# Patient Record
Sex: FEMALE | Race: WHITE | NOT HISPANIC OR LATINO
[De-identification: names, ages, dates, MRNs, and addresses within clinical notes are randomized per-mention and may not be internally consistent; named-entity substitution may affect disease eponyms.]

---

## 2017-01-03 ENCOUNTER — TRANSCRIPTION ENCOUNTER (OUTPATIENT)
Age: 57
End: 2017-01-03

## 2017-09-12 ENCOUNTER — INPATIENT (INPATIENT)
Facility: HOSPITAL | Age: 57
LOS: 8 days | Discharge: HOME | End: 2017-09-21
Attending: PSYCHIATRY & NEUROLOGY | Admitting: PSYCHIATRY & NEUROLOGY
Payer: MEDICARE

## 2017-09-12 VITALS
RESPIRATION RATE: 20 BRPM | SYSTOLIC BLOOD PRESSURE: 170 MMHG | HEART RATE: 72 BPM | OXYGEN SATURATION: 99 % | DIASTOLIC BLOOD PRESSURE: 92 MMHG | TEMPERATURE: 98 F

## 2017-09-12 DIAGNOSIS — F41.1 GENERALIZED ANXIETY DISORDER: ICD-10-CM

## 2017-09-12 DIAGNOSIS — F70 MILD INTELLECTUAL DISABILITIES: ICD-10-CM

## 2017-09-12 DIAGNOSIS — F25.9 SCHIZOAFFECTIVE DISORDER, UNSPECIFIED: ICD-10-CM

## 2017-09-12 DIAGNOSIS — F25.0 SCHIZOAFFECTIVE DISORDER, BIPOLAR TYPE: ICD-10-CM

## 2017-09-12 LAB
ALBUMIN SERPL ELPH-MCNC: 3.9 G/DL — SIGNIFICANT CHANGE UP (ref 3.3–5)
ALP SERPL-CCNC: 50 U/L — SIGNIFICANT CHANGE UP (ref 40–120)
ALT FLD-CCNC: 9 U/L — SIGNIFICANT CHANGE UP (ref 4–33)
APAP SERPL-MCNC: < 15 UG/ML — LOW (ref 15–25)
AST SERPL-CCNC: 18 U/L — SIGNIFICANT CHANGE UP (ref 4–32)
BARBITURATES MEASUREMENT: NEGATIVE — SIGNIFICANT CHANGE UP
BENZODIAZ SERPL-MCNC: NEGATIVE — SIGNIFICANT CHANGE UP
BILIRUB SERPL-MCNC: 0.3 MG/DL — SIGNIFICANT CHANGE UP (ref 0.2–1.2)
BUN SERPL-MCNC: 11 MG/DL — SIGNIFICANT CHANGE UP (ref 7–23)
CALCIUM SERPL-MCNC: 9.4 MG/DL — SIGNIFICANT CHANGE UP (ref 8.4–10.5)
CHLORIDE SERPL-SCNC: 103 MMOL/L — SIGNIFICANT CHANGE UP (ref 98–107)
CO2 SERPL-SCNC: 26 MMOL/L — SIGNIFICANT CHANGE UP (ref 22–31)
CREAT SERPL-MCNC: 0.75 MG/DL — SIGNIFICANT CHANGE UP (ref 0.5–1.3)
ETHANOL BLD-MCNC: < 10 MG/DL — SIGNIFICANT CHANGE UP
GLUCOSE SERPL-MCNC: 87 MG/DL — SIGNIFICANT CHANGE UP (ref 70–99)
HCT VFR BLD CALC: 42.1 % — SIGNIFICANT CHANGE UP (ref 34.5–45)
HGB BLD-MCNC: 13.7 G/DL — SIGNIFICANT CHANGE UP (ref 11.5–15.5)
MCHC RBC-ENTMCNC: 31.9 PG — SIGNIFICANT CHANGE UP (ref 27–34)
MCHC RBC-ENTMCNC: 32.5 % — SIGNIFICANT CHANGE UP (ref 32–36)
MCV RBC AUTO: 97.9 FL — SIGNIFICANT CHANGE UP (ref 80–100)
NRBC # FLD: 0 — SIGNIFICANT CHANGE UP
PLATELET # BLD AUTO: 176 K/UL — SIGNIFICANT CHANGE UP (ref 150–400)
PMV BLD: 10.4 FL — SIGNIFICANT CHANGE UP (ref 7–13)
POTASSIUM SERPL-MCNC: 4.2 MMOL/L — SIGNIFICANT CHANGE UP (ref 3.5–5.3)
POTASSIUM SERPL-SCNC: 4.2 MMOL/L — SIGNIFICANT CHANGE UP (ref 3.5–5.3)
PROT SERPL-MCNC: 6.7 G/DL — SIGNIFICANT CHANGE UP (ref 6–8.3)
RBC # BLD: 4.3 M/UL — SIGNIFICANT CHANGE UP (ref 3.8–5.2)
RBC # FLD: 13 % — SIGNIFICANT CHANGE UP (ref 10.3–14.5)
SALICYLATES SERPL-MCNC: < 5 MG/DL — LOW (ref 15–30)
SODIUM SERPL-SCNC: 143 MMOL/L — SIGNIFICANT CHANGE UP (ref 135–145)
TSH SERPL-MCNC: 3.81 UIU/ML — SIGNIFICANT CHANGE UP (ref 0.27–4.2)
VALPROATE SERPL-MCNC: 108.4 UG/ML — HIGH (ref 50–100)
WBC # BLD: 5.38 K/UL — SIGNIFICANT CHANGE UP (ref 3.8–10.5)
WBC # FLD AUTO: 5.38 K/UL — SIGNIFICANT CHANGE UP (ref 3.8–10.5)

## 2017-09-12 PROCEDURE — 99285 EMERGENCY DEPT VISIT HI MDM: CPT

## 2017-09-12 NOTE — ED BEHAVIORAL HEALTH ASSESSMENT NOTE - MEDICAL ISSUES AND PLAN (INCLUDE STANDING AND PRN MEDICATION)
will restart outpatient medications as per MAR obtained with treatment for seizure disorder, GERD, vitamin D deficiency provided.  Patient is medically cleared as per EM team. Ambulate with walker as per outpatient treatment plan, PT consult ordered, will restart outpatient medications as per MAR obtained with treatment for seizure disorder, GERD, vitamin D deficiency provided.  Patient is medically cleared as per EM team.

## 2017-09-12 NOTE — ED BEHAVIORAL HEALTH ASSESSMENT NOTE - PSYCHIATRIC ISSUES AND PLAN (INCLUDE STANDING AND PRN MEDICATION)
Will restart Zyprexa 5mg in AM and 20mg HS for psychosis, discontinue Stelazine to avoid polypharmacy that has not been beneficial.  Restart Depakote DR at outpatient regimen.  Will temporarily restart Remeron 15mg HS, but d/c Trazodone and consider d/c of Remeron as well due to potential activating effect.  Provided Ativan 1mg PO q6hr prn anxiety and Zyprexa 5mg IM prn severe psychotic agitation. Will restart Zyprexa 5mg in AM and 20mg HS for psychosis, discontinue Stelazine to avoid polypharmacy that has not been beneficial.  Restart Depakote DR 500mg TID at outpatient regimen.  Will temporarily restart Remeron 15mg HS, but d/c Trazodone and consider d/c of Remeron as well due to potential activating effect.  Provided Ativan 1mg PO q6hr prn anxiety and Zyprexa 5mg IM prn severe psychotic agitation.

## 2017-09-12 NOTE — ED BEHAVIORAL HEALTH ASSESSMENT NOTE - CURRENT MEDICATION
see MAR provided by residence Zyprexa 5mg PO AM and 20mg HS, Stelazine 2mg PO BID, Depakote DR 500mg PO TID, Remeron 15mg PO QHS, Trazodone 50mg PO QHS prn, Tamoxifen 20mg daily, Omeprazole 20mg daily, Ditropan 5mg TID, Calcium 500mg + Vitamin D tablet daily, Vitamin D3 1000IU daily, Lactaid 3000units PO daily with any dairy product, Selsun blue 1% topically for scalp daily, Ketoconazole 2% cream apply topically to face daily, Sodium chloride nasal spray 2 sprays each nostril BID, Boost pudding 5% one can daily

## 2017-09-12 NOTE — ED BEHAVIORAL HEALTH ASSESSMENT NOTE - SUMMARY
This is a 57 year old single  female with history of schizoaffective disorder, multiple past psychiatric hospitalizations, PMH of seizure disorder, mild MR, seborrheic dermatitis, hx of breast cancer, GERD, Vitamin D deficiency is sent to [facility] ER by staff from outpatient psychiatrist appointment due to an increase in erratic behavior in the context of acute psychotic symptoms.  Patient has been taking off clothes in public, is notably disorganized, perseverative on delusional themes related to certain types of music having an impact on her behavior.  Patient has been having more incidents of intrusive behavior in residence and continues to ruminate on random topics with erratic behavior exhibited, as per collateral obtained.  Patient has a history of chronic delusions but of concern is reports of increasingly erratic, dangerous behavior over the past 2 weeks.  As per Sabina Plascencia RN patient has been preoccupied with the stove being on and has tried to put her hangs over the fire, has been running into the street and stripping her clothes as well.  Patient is a poor historian, reportedly has a legal guardian in the community, her brother, and due to her worsening symptoms has had recent medication changes without benefit.  There has been a recent increase in both Zyprexa and Depakote dosage, and a recent addition of Stelazine to regimen but patient remains acutely disorganized, delusional, appears to be acutely psychotic with labile affect, and is exhibiting pressured speech at this time.  As per note by Psych NP in the community, conversation with RN at residence and d/w staff member patient is reported to be an acute risk to self at this time.  Patient denies SI, no intent, no plan, no gestures, no HI, acutely paranoid, delusional, tangential in her thought process with loose associations.  Patient denies AH or VH but does appear to be internally stimulated, denies anxiety or panic attacks but is exhibiting obsessional thinking with ruminations.  Patient with chronic mental illness of schizoaffective disorder, baseline intellectual disability but at this time is not at baseline with erratic behavior, poor impulse control and acute psychosis evident.  Patient represents an acute risk to self and requires inpatient psychiatric hospitalization for her safety.  D/W RN from the community supports this plan and states plan to coordinate care with inpatient psych team on Low 4. This is a 57 year old single  female with history of schizoaffective disorder, multiple past psychiatric hospitalizations, PMH of seizure disorder, mild MR, seborrheic dermatitis, hx of breast cancer, GERD, Vitamin D deficiency is sent to [facility] ER by staff from outpatient psychiatrist appointment due to an increase in erratic behavior in the context of acute psychotic symptoms.  Patient has been taking off clothes in public, is notably disorganized, perseverative on delusional themes related to certain types of music having an impact on her behavior.  Patient has been having more incidents of intrusive behavior in residence and continues to ruminate on random topics with erratic behavior exhibited, as per collateral obtained.  Patient has been preoccupied with the stove being on and has tried to put her hands over the fire, has been running into the street and stripping off her clothes as well.  Patient is a poor historian and due to her worsening symptoms has had recent medication changes without benefit.  There has been a recent increase in both Zyprexa and Depakote dosage, and a recent addition of Stelazine to regimen but patient remains acutely disorganized, delusional, appears to be acutely psychotic with labile affect, and is exhibiting pressured speech at this time.  Patient is acutely paranoid, delusional, disorganized in her thought process with loose associations.  Patient denies AH or VH but does appear to be internally stimulated, denies anxiety or panic attacks but is exhibiting obsessional thinking with ruminations.  Patient with chronic mental illness of schizoaffective disorder, baseline intellectual disability but at this time is not at baseline with erratic behavior, poor impulse control and acute psychosis evident.  Patient represents an acute risk to self and requires inpatient psychiatric hospitalization for her safety.  D/W RN from the community who supports psychiatric admission and states plan to coordinate care with inpatient psych team on Low 4.  No indication for constant observation in a locked, supervised setting at this time.  EMS transportation to unit arranged with buckle guard for safe transport.

## 2017-09-12 NOTE — ED BEHAVIORAL HEALTH ASSESSMENT NOTE - HPI (INCLUDE ILLNESS QUALITY, SEVERITY, DURATION, TIMING, CONTEXT, MODIFYING FACTORS, ASSOCIATED SIGNS AND SYMPTOMS)
This is a 57 year old single  female with history of schizoaffective disorder, multiple past psychiatric hospitalizations is sent to [facility] ER by EMS from outpatient psychiatrist appointment due to an increase in erratic behavior in the context of acute psychotic symptoms.  Patient has been taking off clothes in public, is notably disorganized, perseverative on delusional themes related to certain types of music having an impact on her behavior.  Patient has been having more incidents of intrusive behavior in residence and continues to ruminate on random topics with erratic behavior exhibited, as per collateral obtained. This is a 57 year old single  female with history of schizoaffective disorder, multiple past psychiatric hospitalizations is sent to [facility] by EMS from outpatient psychiatrist appointment due to an increase in erratic behavior in the context of acute psychotic symptoms.  Patient has been taking off clothes in public, is notably disorganized, perseverative on delusional themes related to certain types of music having an impact on her behavior.  Patient has been having more incidents of intrusive behavior in residence and continues to ruminate on random topics with erratic behavior exhibited, as per collateral obtained.  Patient has a history of chronic delusions but of concern is reports of increasingly erratic, dangerous behavior over the past 2 weeks.  As per Sabina Plascencia RN patient has been preoccupied with the stove being on and has tried to put her hangs over the fire, has been running into the street and stripping her clothes as well.  Patient is a poor historian, reportedly has a legal guardian in the community, her brother, and due to her worsening symptoms has had recent medication changes without benefit.  There has been a recent increase in both Zyprexa and Depakote dosage, and a recent addition of Stelazine to regimen but patient remains acutely disorganized, delusional, appears to be acutely psychotic with labile affect, and is exhibiting pressured speech at this time.  As per note by Psych NP in the community, conversation with RN at residence and d/w staff member patient is reported to be an acute risk to self at this time.  Patient denies SI, no intent, no plan, no gestures, no HI, acutely paranoid, delusional, tangential in her thought process with loose associations.  Patient denies AH or VH but does appear to be internally stimulated, denies anxiety or panic attacks but is exhibiting obsessional thinking with ruminations.  Patient with chronic mental illness of schizoaffective disorder, baseline intellectual disibility but at this time is not at baseline with erratic behavior, poor impulse control and acute psychosis evident.  Patient represents an acute risk to self and requires inpatient psychiatric hosptialization for her safety.  D/W RN from the community supports this plan and states plan to coordinate care with inpatient psych team on Low 4. This is a 57 year old single  female with history of schizoaffective disorder, multiple past psychiatric hospitalizations, PMH of seizure disorder, mild MR, seborrheic dermatitis, hx of breast cancer, GERD, Vitamin D deficiency is sent to [facility] ER by staff from outpatient psychiatrist appointment due to an increase in erratic behavior in the context of acute psychotic symptoms.  Patient has been taking off clothes in public, is notably disorganized, perseverative on delusional themes related to certain types of music having an impact on her behavior.  Patient has been having more incidents of intrusive behavior in residence and continues to ruminate on random topics with erratic behavior exhibited, as per collateral obtained.  Patient has a history of chronic delusions but of concern is reports of increasingly erratic, dangerous behavior over the past 2 weeks.  As per Sabina Plascencia RN patient has been preoccupied with the stove being on and has tried to put her hangs over the fire, has been running into the street and stripping her clothes as well.  Patient is a poor historian, reportedly has a legal guardian in the community, her brother, and due to her worsening symptoms has had recent medication changes without benefit.  There has been a recent increase in both Zyprexa and Depakote dosage, and a recent addition of Stelazine to regimen but patient remains acutely disorganized, delusional, appears to be acutely psychotic with labile affect, and is exhibiting pressured speech at this time.  As per note by Psych NP in the community, conversation with RN at residence and d/w staff member patient is reported to be an acute risk to self at this time.  Patient denies SI, no intent, no plan, no gestures, no HI, acutely paranoid, delusional, tangential in her thought process with loose associations.  Patient denies AH or VH but does appear to be internally stimulated, denies anxiety or panic attacks but is exhibiting obsessional thinking with ruminations.  Patient with chronic mental illness of schizoaffective disorder, baseline intellectual disability but at this time is not at baseline with erratic behavior, poor impulse control and acute psychosis evident.  Patient represents an acute risk to self and requires inpatient psychiatric hosptialization for her safety.  D/W RN from the community supports this plan and states plan to coordinate care with inpatient psych team on Low 4. This is a 57 year old single  female with history of schizoaffective disorder, multiple past psychiatric hospitalizations, PMH of seizure disorder, mild MR, seborrheic dermatitis, hx of breast cancer, GERD, Vitamin D deficiency is sent to [facility] ER by staff from outpatient psychiatrist appointment due to an increase in erratic behavior in the context of acute psychotic symptoms.  Patient has been taking off clothes in public, is notably disorganized, perseverative on delusional themes related to certain types of music having an impact on her behavior.  Patient has been having more incidents of intrusive behavior in residence and continues to ruminate on random topics with erratic behavior exhibited, as per collateral obtained.  Patient has a history of chronic delusions but of concern is reports of increasingly erratic, dangerous behavior over the past 2 weeks.  As per Sabina Plascencia RN patient has been preoccupied with the stove being on and has tried to put her hands over the fire, has been running into the street and stripping her clothes off as well.  Patient is a poor historian, reportedly has a legal guardian in the community, her brother, and due to her worsening symptoms has had recent medication changes without benefit.  There has been a recent increase in both Zyprexa and Depakote dosage, and a recent addition of Stelazine to regimen but patient remains acutely disorganized, delusional, appears to be acutely psychotic with labile affect, and is exhibiting pressured speech at this time.  As per note by Psych NP in the community, conversation with RN at residence and d/w staff member patient is reported to be an acute risk to self at this time.  Patient denies SI, no intent, no plan, no gestures, no HI, but is acutely paranoid, delusional, tangential in her thought process with loose associations.  Patient denies AH or VH but does appear to be internally stimulated, denies anxiety or panic attacks but is exhibiting obsessional thinking with ruminations.  Patient with chronic mental illness of schizoaffective disorder, baseline intellectual disability but at this time is not at baseline with erratic behavior, poor impulse control and acute psychosis evident.  Patient represents an acute risk to self and requires inpatient psychiatric hospitalization for her safety.  D/W RN from the community who supports this plan and states plan to coordinate care with inpatient psych team on Low 4. This is a 57 year old single  female with history of schizoaffective disorder, multiple past psychiatric hospitalizations, no history of suicide attempts, no history of violent behavior, no history of substance abuse, PMH of seizure disorder, mild MR, seborrheic dermatitis, hx of breast cancer, GERD, Vitamin D deficiency is sent to [facility] ER by staff from outpatient psychiatrist appointment due to an increase in erratic behavior in the context of acute psychotic symptoms.  Patient has been taking off clothes in public, is notably disorganized, perseverative on delusional themes related to certain types of music having an impact on her behavior.  Patient has been having more incidents of intrusive behavior in residence and continues to ruminate on random topics with erratic behavior exhibited, as per collateral obtained.  Patient has a history of chronic delusions but of concern is reports of increasingly erratic, dangerous behavior over the past 2 weeks.  As per Sabina Plascencia RN patient has been preoccupied with the stove being on and has tried to put her hands over the fire, has been running into the street and stripping her clothes off as well.  Patient is a poor historian, reportedly has a legal guardian in the community, her brother, and due to her worsening symptoms has had recent medication changes without benefit.  There has been a recent increase in both Zyprexa and Depakote dosage, and a recent addition of Stelazine to regimen but patient remains acutely disorganized, delusional, appears to be acutely psychotic with labile affect, and is exhibiting pressured speech at this time.  As per note by Psych NP in the community, conversation with RN at residence and d/w staff member patient is reported to be an acute risk to self at this time.  Patient denies SI, no intent, no plan, no gestures, no HI, but is acutely paranoid, delusional, tangential in her thought process with loose associations.  Patient denies AH or VH but does appear to be internally stimulated, denies anxiety or panic attacks but is exhibiting obsessional thinking with ruminations.  Patient has reportedly been medication compliant with depakote level of 108 received consistent with such.  Patient with chronic mental illness of schizoaffective disorder, baseline intellectual disability but at this time is not at baseline with erratic behavior, poor impulse control and acute psychosis evident.  Patient represents an acute risk to self and requires inpatient psychiatric hospitalization for her safety.  D/W RN from the community who supports this plan and states plan to coordinate care with inpatient psych team on Low 4.

## 2017-09-12 NOTE — ED BEHAVIORAL HEALTH ASSESSMENT NOTE - AXIS III
Seizure disorder, hx of breast cancer/right breast mastectomy, GERD, Seborrheic dermatitis, Arthritis and contractures of bilateral knees, hx of urinary incontinence, vitamin D deficiency

## 2017-09-12 NOTE — ED PROVIDER NOTE - PMH
Breast cancer    GERD (gastroesophageal reflux disease)    MR (mental retardation)    Seizure    Vitamin D deficiency

## 2017-09-12 NOTE — ED PROVIDER NOTE - NS ED ROS FT
Denies chest pain, SOB, N/V/D and fevers, Denies palpitations or diaphoresis. Denies Numbness, Tingling, Blurry Vision and HA.   Denies recent falls, trauma and injuries. Denies pain or any other medical complaints.

## 2017-09-12 NOTE — ED BEHAVIORAL HEALTH ASSESSMENT NOTE - OTHER PAST PSYCHIATRIC HISTORY (INCLUDE DETAILS REGARDING ONSET, COURSE OF ILLNESS, INPATIENT/OUTPATIENT TREATMENT)
History of documented schizoaffective disorder, generalized anxiety disorder, PTSD, mild intellectual disabilities who sees psychiatric NP Shruti Spencer at [facility], most recently today on 9/12 for a crisis appointment.  Patient with multiple psychiatric hospitalizations including most recently on 4/19/17-5/11/17 at [facility].  No reported history of suicide attempts.

## 2017-09-12 NOTE — ED BEHAVIORAL HEALTH ASSESSMENT NOTE - DESCRIPTION
Seborrheic dermatitis, GERD, mild MR, hx of breast cancer, osteoporosis/osteoarthritis, Vitamin D deficiency, urinary incontinence calm, cooperative, no prns required during ED course lives at [facility] lives at [facility] residence located at [address] [place] [address] in [place], [place], has been living there for over 5 years.  Reportedly has a legal guardian, brother, as per d/w RN, Sabina Plascencia.

## 2017-09-12 NOTE — ED PROVIDER NOTE - OBJECTIVE STATEMENT
This is a 57 year old Female PMH of seizure disorder, mild MR, seborrheic dermatitis, hx of breast cancer, GERD, Vitamin D deficiency is sent to [facility] ER by staff from outpatient psychiatrist appointment due to an increase in erratic behavior in the context of acute psychotic symptoms.  Patient has been taking off clothes in public, is notably disorganized, perseverative on delusional themes related to certain types of music having an impact on her behavior.

## 2017-09-12 NOTE — ED PROVIDER NOTE - MEDICAL DECISION MAKING DETAILS
This is a 57 year old Female PMH of seizure disorder, mild MR, seborrheic dermatitis, hx of breast cancer, GERD, Vitamin D deficiency is sent to [facility] ER by staff from outpatient psychiatrist appointment due to an increase in erratic behavior in the context of acute psychotic symptoms.  Medical evaluation performed. There is no clinical evidence of intoxication or any acute medical problem requiring immediate intervention. Final disposition will be determined by psychiatrist.

## 2017-09-13 PROCEDURE — 99223 1ST HOSP IP/OBS HIGH 75: CPT

## 2017-09-13 RX ORDER — SELENIUM SULFIDE/ALOE VERA 1 %
1 SHAMPOO TOPICAL DAILY
Qty: 0 | Refills: 0 | Status: DISCONTINUED | OUTPATIENT
Start: 2017-09-13 | End: 2017-09-13

## 2017-09-13 RX ORDER — TRAZODONE HCL 50 MG
50 TABLET ORAL AT BEDTIME
Qty: 0 | Refills: 0 | Status: DISCONTINUED | OUTPATIENT
Start: 2017-09-13 | End: 2017-09-21

## 2017-09-13 RX ORDER — OLANZAPINE 15 MG/1
5 TABLET, FILM COATED ORAL DAILY
Qty: 0 | Refills: 0 | Status: DISCONTINUED | OUTPATIENT
Start: 2017-09-13 | End: 2017-09-21

## 2017-09-13 RX ORDER — TAMOXIFEN CITRATE 20 MG/1
20 TABLET, FILM COATED ORAL DAILY
Qty: 0 | Refills: 0 | Status: DISCONTINUED | OUTPATIENT
Start: 2017-09-13 | End: 2017-09-21

## 2017-09-13 RX ORDER — MIRTAZAPINE 45 MG/1
15 TABLET, ORALLY DISINTEGRATING ORAL AT BEDTIME
Qty: 0 | Refills: 0 | Status: DISCONTINUED | OUTPATIENT
Start: 2017-09-13 | End: 2017-09-13

## 2017-09-13 RX ORDER — CHOLECALCIFEROL (VITAMIN D3) 125 MCG
1000 CAPSULE ORAL DAILY
Qty: 0 | Refills: 0 | Status: DISCONTINUED | OUTPATIENT
Start: 2017-09-13 | End: 2017-09-21

## 2017-09-13 RX ORDER — SODIUM CHLORIDE 0.65 %
1 AEROSOL, SPRAY (ML) NASAL
Qty: 0 | Refills: 0 | Status: DISCONTINUED | OUTPATIENT
Start: 2017-09-13 | End: 2017-09-21

## 2017-09-13 RX ORDER — OLANZAPINE 15 MG/1
20 TABLET, FILM COATED ORAL AT BEDTIME
Qty: 0 | Refills: 0 | Status: DISCONTINUED | OUTPATIENT
Start: 2017-09-13 | End: 2017-09-13

## 2017-09-13 RX ORDER — PANTOPRAZOLE SODIUM 20 MG/1
40 TABLET, DELAYED RELEASE ORAL
Qty: 0 | Refills: 0 | Status: DISCONTINUED | OUTPATIENT
Start: 2017-09-13 | End: 2017-09-21

## 2017-09-13 RX ORDER — OLANZAPINE 15 MG/1
25 TABLET, FILM COATED ORAL AT BEDTIME
Qty: 0 | Refills: 0 | Status: DISCONTINUED | OUTPATIENT
Start: 2017-09-13 | End: 2017-09-15

## 2017-09-13 RX ORDER — KETOCONAZOLE 20 MG/G
1 AEROSOL, FOAM TOPICAL DAILY
Qty: 0 | Refills: 0 | Status: DISCONTINUED | OUTPATIENT
Start: 2017-09-13 | End: 2017-09-21

## 2017-09-13 RX ORDER — OXYBUTYNIN CHLORIDE 5 MG
5 TABLET ORAL THREE TIMES A DAY
Qty: 0 | Refills: 0 | Status: DISCONTINUED | OUTPATIENT
Start: 2017-09-13 | End: 2017-09-21

## 2017-09-13 RX ORDER — DIVALPROEX SODIUM 500 MG/1
500 TABLET, DELAYED RELEASE ORAL THREE TIMES A DAY
Qty: 0 | Refills: 0 | Status: DISCONTINUED | OUTPATIENT
Start: 2017-09-13 | End: 2017-09-15

## 2017-09-13 RX ORDER — B-COMPLEX WITH VITAMIN C
1 CAPSULE ORAL
Qty: 0 | Refills: 0 | COMMUNITY

## 2017-09-13 RX ORDER — TRAZODONE HCL 50 MG
1 TABLET ORAL
Qty: 0 | Refills: 0 | COMMUNITY

## 2017-09-13 RX ORDER — CALCIUM CARBONATE 500(1250)
1 TABLET ORAL DAILY
Qty: 0 | Refills: 0 | Status: DISCONTINUED | OUTPATIENT
Start: 2017-09-13 | End: 2017-09-21

## 2017-09-13 RX ORDER — SELENIUM SULFIDE/ALOE VERA 1 %
1 SHAMPOO TOPICAL DAILY
Qty: 0 | Refills: 0 | Status: DISCONTINUED | OUTPATIENT
Start: 2017-09-13 | End: 2017-09-21

## 2017-09-13 RX ORDER — TRIFLUOPERAZINE HCL 5 MG
1 TABLET ORAL
Qty: 0 | Refills: 0 | COMMUNITY

## 2017-09-13 RX ORDER — B-COMPLEX WITH VITAMIN C
1 CAPSULE ORAL DAILY
Qty: 0 | Refills: 0 | Status: DISCONTINUED | OUTPATIENT
Start: 2017-09-13 | End: 2017-09-21

## 2017-09-13 RX ORDER — OLANZAPINE 15 MG/1
5 TABLET, FILM COATED ORAL ONCE
Qty: 0 | Refills: 0 | Status: DISCONTINUED | OUTPATIENT
Start: 2017-09-13 | End: 2017-09-21

## 2017-09-13 RX ADMIN — TAMOXIFEN CITRATE 20 MILLIGRAM(S): 20 TABLET, FILM COATED ORAL at 10:07

## 2017-09-13 RX ADMIN — DIVALPROEX SODIUM 500 MILLIGRAM(S): 500 TABLET, DELAYED RELEASE ORAL at 10:07

## 2017-09-13 RX ADMIN — Medication 5 MILLIGRAM(S): at 10:07

## 2017-09-13 RX ADMIN — Medication 1 MILLIGRAM(S): at 21:00

## 2017-09-13 RX ADMIN — Medication 50 MILLIGRAM(S): at 21:14

## 2017-09-13 RX ADMIN — Medication 1 TABLET(S): at 18:27

## 2017-09-13 RX ADMIN — Medication 5 MILLIGRAM(S): at 21:14

## 2017-09-13 RX ADMIN — Medication 1 TABLET(S): at 10:07

## 2017-09-13 RX ADMIN — OLANZAPINE 25 MILLIGRAM(S): 15 TABLET, FILM COATED ORAL at 21:14

## 2017-09-13 RX ADMIN — Medication 5 MILLIGRAM(S): at 12:19

## 2017-09-13 RX ADMIN — Medication 1 APPLICATION(S): at 11:30

## 2017-09-13 RX ADMIN — DIVALPROEX SODIUM 500 MILLIGRAM(S): 500 TABLET, DELAYED RELEASE ORAL at 12:19

## 2017-09-13 RX ADMIN — Medication 1000 UNIT(S): at 10:07

## 2017-09-13 RX ADMIN — DIVALPROEX SODIUM 500 MILLIGRAM(S): 500 TABLET, DELAYED RELEASE ORAL at 21:14

## 2017-09-13 RX ADMIN — OLANZAPINE 5 MILLIGRAM(S): 15 TABLET, FILM COATED ORAL at 10:07

## 2017-09-13 RX ADMIN — KETOCONAZOLE 1 APPLICATION(S): 20 AEROSOL, FOAM TOPICAL at 10:07

## 2017-09-13 RX ADMIN — PANTOPRAZOLE SODIUM 40 MILLIGRAM(S): 20 TABLET, DELAYED RELEASE ORAL at 10:07

## 2017-09-14 PROCEDURE — 99232 SBSQ HOSP IP/OBS MODERATE 35: CPT

## 2017-09-14 RX ADMIN — Medication 1 MILLIGRAM(S): at 20:30

## 2017-09-14 RX ADMIN — OLANZAPINE 25 MILLIGRAM(S): 15 TABLET, FILM COATED ORAL at 20:36

## 2017-09-14 RX ADMIN — TAMOXIFEN CITRATE 20 MILLIGRAM(S): 20 TABLET, FILM COATED ORAL at 10:18

## 2017-09-14 RX ADMIN — Medication 1000 UNIT(S): at 10:17

## 2017-09-14 RX ADMIN — Medication 5 MILLIGRAM(S): at 20:36

## 2017-09-14 RX ADMIN — OLANZAPINE 5 MILLIGRAM(S): 15 TABLET, FILM COATED ORAL at 10:18

## 2017-09-14 RX ADMIN — Medication 1 TABLET(S): at 10:16

## 2017-09-14 RX ADMIN — Medication 1 TABLET(S): at 10:17

## 2017-09-14 RX ADMIN — DIVALPROEX SODIUM 500 MILLIGRAM(S): 500 TABLET, DELAYED RELEASE ORAL at 10:17

## 2017-09-14 RX ADMIN — PANTOPRAZOLE SODIUM 40 MILLIGRAM(S): 20 TABLET, DELAYED RELEASE ORAL at 09:00

## 2017-09-14 RX ADMIN — Medication 5 MILLIGRAM(S): at 13:47

## 2017-09-14 RX ADMIN — DIVALPROEX SODIUM 500 MILLIGRAM(S): 500 TABLET, DELAYED RELEASE ORAL at 13:47

## 2017-09-14 RX ADMIN — Medication 5 MILLIGRAM(S): at 10:18

## 2017-09-14 RX ADMIN — Medication 50 MILLIGRAM(S): at 20:36

## 2017-09-14 RX ADMIN — DIVALPROEX SODIUM 500 MILLIGRAM(S): 500 TABLET, DELAYED RELEASE ORAL at 20:36

## 2017-09-15 PROCEDURE — 99232 SBSQ HOSP IP/OBS MODERATE 35: CPT

## 2017-09-15 PROCEDURE — 93010 ELECTROCARDIOGRAM REPORT: CPT

## 2017-09-15 RX ORDER — VALPROIC ACID (AS SODIUM SALT) 250 MG/5ML
500 SOLUTION, ORAL ORAL THREE TIMES A DAY
Qty: 0 | Refills: 0 | Status: DISCONTINUED | OUTPATIENT
Start: 2017-09-15 | End: 2017-09-21

## 2017-09-15 RX ORDER — OLANZAPINE 15 MG/1
30 TABLET, FILM COATED ORAL AT BEDTIME
Qty: 0 | Refills: 0 | Status: DISCONTINUED | OUTPATIENT
Start: 2017-09-15 | End: 2017-09-21

## 2017-09-15 RX ADMIN — OLANZAPINE 30 MILLIGRAM(S): 15 TABLET, FILM COATED ORAL at 21:14

## 2017-09-15 RX ADMIN — PANTOPRAZOLE SODIUM 40 MILLIGRAM(S): 20 TABLET, DELAYED RELEASE ORAL at 08:59

## 2017-09-15 RX ADMIN — Medication 5 MILLIGRAM(S): at 21:14

## 2017-09-15 RX ADMIN — Medication 5 MILLIGRAM(S): at 13:14

## 2017-09-15 RX ADMIN — Medication 50 MILLIGRAM(S): at 21:14

## 2017-09-15 RX ADMIN — OLANZAPINE 5 MILLIGRAM(S): 15 TABLET, FILM COATED ORAL at 08:59

## 2017-09-15 RX ADMIN — Medication 5 MILLIGRAM(S): at 08:59

## 2017-09-15 RX ADMIN — Medication 1 TABLET(S): at 08:59

## 2017-09-15 RX ADMIN — KETOCONAZOLE 1 APPLICATION(S): 20 AEROSOL, FOAM TOPICAL at 08:59

## 2017-09-15 RX ADMIN — Medication 1000 UNIT(S): at 08:59

## 2017-09-15 RX ADMIN — TAMOXIFEN CITRATE 20 MILLIGRAM(S): 20 TABLET, FILM COATED ORAL at 08:59

## 2017-09-15 RX ADMIN — DIVALPROEX SODIUM 500 MILLIGRAM(S): 500 TABLET, DELAYED RELEASE ORAL at 08:59

## 2017-09-15 RX ADMIN — Medication 500 MILLIGRAM(S): at 21:14

## 2017-09-15 RX ADMIN — Medication 500 MILLIGRAM(S): at 13:14

## 2017-09-15 RX ADMIN — Medication 1 APPLICATION(S): at 08:59

## 2017-09-16 PROCEDURE — 99232 SBSQ HOSP IP/OBS MODERATE 35: CPT

## 2017-09-16 RX ADMIN — OLANZAPINE 30 MILLIGRAM(S): 15 TABLET, FILM COATED ORAL at 22:05

## 2017-09-16 RX ADMIN — Medication 500 MILLIGRAM(S): at 13:54

## 2017-09-16 RX ADMIN — Medication 1000 UNIT(S): at 09:10

## 2017-09-16 RX ADMIN — KETOCONAZOLE 1 APPLICATION(S): 20 AEROSOL, FOAM TOPICAL at 09:10

## 2017-09-16 RX ADMIN — PANTOPRAZOLE SODIUM 40 MILLIGRAM(S): 20 TABLET, DELAYED RELEASE ORAL at 09:10

## 2017-09-16 RX ADMIN — Medication 1 TABLET(S): at 09:09

## 2017-09-16 RX ADMIN — Medication 1 APPLICATION(S): at 09:10

## 2017-09-16 RX ADMIN — Medication 500 MILLIGRAM(S): at 22:06

## 2017-09-16 RX ADMIN — Medication 1 TABLET(S): at 09:10

## 2017-09-16 RX ADMIN — Medication 50 MILLIGRAM(S): at 22:06

## 2017-09-16 RX ADMIN — Medication 5 MILLIGRAM(S): at 09:10

## 2017-09-16 RX ADMIN — TAMOXIFEN CITRATE 20 MILLIGRAM(S): 20 TABLET, FILM COATED ORAL at 09:10

## 2017-09-16 RX ADMIN — Medication 5 MILLIGRAM(S): at 22:06

## 2017-09-16 RX ADMIN — OLANZAPINE 5 MILLIGRAM(S): 15 TABLET, FILM COATED ORAL at 09:10

## 2017-09-16 RX ADMIN — Medication 500 MILLIGRAM(S): at 09:10

## 2017-09-16 RX ADMIN — Medication 5 MILLIGRAM(S): at 13:54

## 2017-09-17 PROCEDURE — 99232 SBSQ HOSP IP/OBS MODERATE 35: CPT

## 2017-09-17 RX ADMIN — OLANZAPINE 30 MILLIGRAM(S): 15 TABLET, FILM COATED ORAL at 21:32

## 2017-09-17 RX ADMIN — Medication 500 MILLIGRAM(S): at 21:32

## 2017-09-17 RX ADMIN — Medication 50 MILLIGRAM(S): at 21:32

## 2017-09-17 RX ADMIN — Medication 1 TABLET(S): at 08:54

## 2017-09-17 RX ADMIN — Medication 1 APPLICATION(S): at 13:21

## 2017-09-17 RX ADMIN — KETOCONAZOLE 1 APPLICATION(S): 20 AEROSOL, FOAM TOPICAL at 08:54

## 2017-09-17 RX ADMIN — Medication 5 MILLIGRAM(S): at 21:32

## 2017-09-17 RX ADMIN — PANTOPRAZOLE SODIUM 40 MILLIGRAM(S): 20 TABLET, DELAYED RELEASE ORAL at 08:54

## 2017-09-17 RX ADMIN — TAMOXIFEN CITRATE 20 MILLIGRAM(S): 20 TABLET, FILM COATED ORAL at 08:55

## 2017-09-17 RX ADMIN — Medication 500 MILLIGRAM(S): at 08:55

## 2017-09-17 RX ADMIN — Medication 1000 UNIT(S): at 08:54

## 2017-09-17 RX ADMIN — Medication 5 MILLIGRAM(S): at 08:54

## 2017-09-17 RX ADMIN — OLANZAPINE 5 MILLIGRAM(S): 15 TABLET, FILM COATED ORAL at 08:54

## 2017-09-17 RX ADMIN — Medication 5 MILLIGRAM(S): at 13:21

## 2017-09-18 PROCEDURE — 99232 SBSQ HOSP IP/OBS MODERATE 35: CPT

## 2017-09-18 RX ADMIN — Medication 500 MILLIGRAM(S): at 20:41

## 2017-09-18 RX ADMIN — Medication 1 MILLIGRAM(S): at 19:10

## 2017-09-18 RX ADMIN — KETOCONAZOLE 1 APPLICATION(S): 20 AEROSOL, FOAM TOPICAL at 08:53

## 2017-09-18 RX ADMIN — TAMOXIFEN CITRATE 20 MILLIGRAM(S): 20 TABLET, FILM COATED ORAL at 08:53

## 2017-09-18 RX ADMIN — Medication 500 MILLIGRAM(S): at 12:50

## 2017-09-18 RX ADMIN — Medication 5 MILLIGRAM(S): at 20:41

## 2017-09-18 RX ADMIN — Medication 1000 UNIT(S): at 08:45

## 2017-09-18 RX ADMIN — OLANZAPINE 30 MILLIGRAM(S): 15 TABLET, FILM COATED ORAL at 20:41

## 2017-09-18 RX ADMIN — Medication 50 MILLIGRAM(S): at 20:41

## 2017-09-18 RX ADMIN — PANTOPRAZOLE SODIUM 40 MILLIGRAM(S): 20 TABLET, DELAYED RELEASE ORAL at 08:53

## 2017-09-18 RX ADMIN — OLANZAPINE 5 MILLIGRAM(S): 15 TABLET, FILM COATED ORAL at 08:53

## 2017-09-18 RX ADMIN — Medication 1 TABLET(S): at 08:45

## 2017-09-18 RX ADMIN — Medication 5 MILLIGRAM(S): at 12:50

## 2017-09-18 RX ADMIN — Medication 500 MILLIGRAM(S): at 08:53

## 2017-09-18 RX ADMIN — Medication 5 MILLIGRAM(S): at 08:53

## 2017-09-18 RX ADMIN — Medication 1 APPLICATION(S): at 08:53

## 2017-09-19 PROCEDURE — 99232 SBSQ HOSP IP/OBS MODERATE 35: CPT

## 2017-09-19 RX ADMIN — Medication 5 MILLIGRAM(S): at 09:00

## 2017-09-19 RX ADMIN — Medication 1 TABLET(S): at 09:00

## 2017-09-19 RX ADMIN — TAMOXIFEN CITRATE 20 MILLIGRAM(S): 20 TABLET, FILM COATED ORAL at 09:00

## 2017-09-19 RX ADMIN — KETOCONAZOLE 1 APPLICATION(S): 20 AEROSOL, FOAM TOPICAL at 11:17

## 2017-09-19 RX ADMIN — Medication 500 MILLIGRAM(S): at 21:42

## 2017-09-19 RX ADMIN — Medication 5 MILLIGRAM(S): at 21:42

## 2017-09-19 RX ADMIN — PANTOPRAZOLE SODIUM 40 MILLIGRAM(S): 20 TABLET, DELAYED RELEASE ORAL at 09:00

## 2017-09-19 RX ADMIN — OLANZAPINE 30 MILLIGRAM(S): 15 TABLET, FILM COATED ORAL at 21:42

## 2017-09-19 RX ADMIN — Medication 1 APPLICATION(S): at 11:17

## 2017-09-19 RX ADMIN — Medication 5 MILLIGRAM(S): at 12:44

## 2017-09-19 RX ADMIN — OLANZAPINE 5 MILLIGRAM(S): 15 TABLET, FILM COATED ORAL at 09:00

## 2017-09-19 RX ADMIN — Medication 50 MILLIGRAM(S): at 21:42

## 2017-09-19 RX ADMIN — Medication 500 MILLIGRAM(S): at 09:00

## 2017-09-19 RX ADMIN — Medication 500 MILLIGRAM(S): at 12:45

## 2017-09-19 RX ADMIN — Medication 1000 UNIT(S): at 09:00

## 2017-09-20 PROCEDURE — 99232 SBSQ HOSP IP/OBS MODERATE 35: CPT

## 2017-09-20 RX ORDER — OXYBUTYNIN CHLORIDE 5 MG
1 TABLET ORAL
Qty: 0 | Refills: 0 | COMMUNITY

## 2017-09-20 RX ORDER — OXYMETAZOLINE HYDROCHLORIDE 0.5 MG/ML
2 SPRAY NASAL
Qty: 1 | Refills: 0 | OUTPATIENT
Start: 2017-09-20 | End: 2017-10-05

## 2017-09-20 RX ORDER — OMEPRAZOLE 10 MG/1
1 CAPSULE, DELAYED RELEASE ORAL
Qty: 0 | Refills: 0 | COMMUNITY

## 2017-09-20 RX ORDER — OLANZAPINE 15 MG/1
2 TABLET, FILM COATED ORAL
Qty: 30 | Refills: 0 | OUTPATIENT
Start: 2017-09-20 | End: 2017-10-05

## 2017-09-20 RX ORDER — CHOLECALCIFEROL (VITAMIN D3) 125 MCG
1000 CAPSULE ORAL
Qty: 15 | Refills: 0 | OUTPATIENT
Start: 2017-09-20 | End: 2017-10-05

## 2017-09-20 RX ORDER — B-COMPLEX WITH VITAMIN C
1 CAPSULE ORAL
Qty: 45 | Refills: 0 | OUTPATIENT
Start: 2017-09-20 | End: 2017-10-05

## 2017-09-20 RX ORDER — DIVALPROEX SODIUM 500 MG/1
1 TABLET, DELAYED RELEASE ORAL
Qty: 0 | Refills: 0 | COMMUNITY

## 2017-09-20 RX ORDER — TAMOXIFEN CITRATE 20 MG/1
1 TABLET, FILM COATED ORAL
Qty: 15 | Refills: 0 | OUTPATIENT
Start: 2017-09-20 | End: 2017-10-05

## 2017-09-20 RX ORDER — MIRTAZAPINE 45 MG/1
1 TABLET, ORALLY DISINTEGRATING ORAL
Qty: 0 | Refills: 0 | COMMUNITY

## 2017-09-20 RX ORDER — CALCIUM CARBONATE 500(1250)
1 TABLET ORAL
Qty: 15 | Refills: 0 | OUTPATIENT
Start: 2017-09-20 | End: 2017-10-05

## 2017-09-20 RX ORDER — SELENIUM SULFIDE/ALOE VERA 1 %
1 SHAMPOO TOPICAL
Qty: 1 | Refills: 0 | OUTPATIENT
Start: 2017-09-20 | End: 2017-10-05

## 2017-09-20 RX ORDER — KETOCONAZOLE 20 MG/G
1 AEROSOL, FOAM TOPICAL
Qty: 0 | Refills: 0 | COMMUNITY

## 2017-09-20 RX ORDER — OXYMETAZOLINE HYDROCHLORIDE 0.5 MG/ML
2 SPRAY NASAL
Qty: 0 | Refills: 0 | COMMUNITY

## 2017-09-20 RX ORDER — OLANZAPINE 15 MG/1
1 TABLET, FILM COATED ORAL
Qty: 0 | Refills: 0 | COMMUNITY

## 2017-09-20 RX ORDER — DIVALPROEX SODIUM 500 MG/1
1 TABLET, DELAYED RELEASE ORAL
Qty: 45 | Refills: 0 | OUTPATIENT
Start: 2017-09-20 | End: 2017-10-05

## 2017-09-20 RX ORDER — OXYBUTYNIN CHLORIDE 5 MG
1 TABLET ORAL
Qty: 45 | Refills: 0 | OUTPATIENT
Start: 2017-09-20 | End: 2017-10-05

## 2017-09-20 RX ORDER — SELENIUM SULFIDE/ALOE VERA 1 %
1 SHAMPOO TOPICAL
Qty: 0 | Refills: 0 | COMMUNITY

## 2017-09-20 RX ORDER — CHOLECALCIFEROL (VITAMIN D3) 125 MCG
1 CAPSULE ORAL
Qty: 0 | Refills: 0 | COMMUNITY

## 2017-09-20 RX ORDER — TRAZODONE HCL 50 MG
1 TABLET ORAL
Qty: 15 | Refills: 0 | OUTPATIENT
Start: 2017-09-20 | End: 2017-10-05

## 2017-09-20 RX ORDER — OLANZAPINE 15 MG/1
1 TABLET, FILM COATED ORAL
Qty: 15 | Refills: 0 | OUTPATIENT
Start: 2017-09-20 | End: 2017-10-05

## 2017-09-20 RX ORDER — KETOCONAZOLE 20 MG/G
1 AEROSOL, FOAM TOPICAL
Qty: 1 | Refills: 0 | OUTPATIENT
Start: 2017-09-20 | End: 2017-10-05

## 2017-09-20 RX ORDER — TAMOXIFEN CITRATE 20 MG/1
1 TABLET, FILM COATED ORAL
Qty: 0 | Refills: 0 | COMMUNITY

## 2017-09-20 RX ORDER — OMEPRAZOLE 10 MG/1
1 CAPSULE, DELAYED RELEASE ORAL
Qty: 15 | Refills: 0 | OUTPATIENT
Start: 2017-09-20 | End: 2017-10-05

## 2017-09-20 RX ADMIN — TAMOXIFEN CITRATE 20 MILLIGRAM(S): 20 TABLET, FILM COATED ORAL at 10:06

## 2017-09-20 RX ADMIN — OLANZAPINE 5 MILLIGRAM(S): 15 TABLET, FILM COATED ORAL at 10:06

## 2017-09-20 RX ADMIN — OLANZAPINE 30 MILLIGRAM(S): 15 TABLET, FILM COATED ORAL at 20:03

## 2017-09-20 RX ADMIN — Medication 5 MILLIGRAM(S): at 10:06

## 2017-09-20 RX ADMIN — Medication 1 TABLET(S): at 10:06

## 2017-09-20 RX ADMIN — Medication 500 MILLIGRAM(S): at 20:03

## 2017-09-20 RX ADMIN — Medication 500 MILLIGRAM(S): at 10:06

## 2017-09-20 RX ADMIN — Medication 1000 UNIT(S): at 10:06

## 2017-09-20 RX ADMIN — KETOCONAZOLE 1 APPLICATION(S): 20 AEROSOL, FOAM TOPICAL at 10:06

## 2017-09-20 RX ADMIN — PANTOPRAZOLE SODIUM 40 MILLIGRAM(S): 20 TABLET, DELAYED RELEASE ORAL at 10:06

## 2017-09-20 RX ADMIN — Medication 1 MILLIGRAM(S): at 20:03

## 2017-09-20 RX ADMIN — Medication 5 MILLIGRAM(S): at 20:03

## 2017-09-20 RX ADMIN — Medication 50 MILLIGRAM(S): at 20:03

## 2017-09-20 RX ADMIN — Medication 5 MILLIGRAM(S): at 13:10

## 2017-09-20 RX ADMIN — Medication 1 APPLICATION(S): at 10:06

## 2017-09-20 RX ADMIN — Medication 500 MILLIGRAM(S): at 13:10

## 2017-09-21 VITALS
SYSTOLIC BLOOD PRESSURE: 110 MMHG | TEMPERATURE: 98 F | DIASTOLIC BLOOD PRESSURE: 67 MMHG | HEART RATE: 65 BPM | RESPIRATION RATE: 18 BRPM

## 2017-09-21 LAB
CHOLEST SERPL-MCNC: 125 MG/DL — SIGNIFICANT CHANGE UP (ref 120–199)
HBA1C BLD-MCNC: 5.3 % — SIGNIFICANT CHANGE UP (ref 4–5.6)
HDLC SERPL-MCNC: 44 MG/DL — LOW (ref 45–65)
LIPID PNL WITH DIRECT LDL SERPL: 65 MG/DL — SIGNIFICANT CHANGE UP
TRIGL SERPL-MCNC: 114 MG/DL — SIGNIFICANT CHANGE UP (ref 10–149)

## 2017-09-21 PROCEDURE — 99238 HOSP IP/OBS DSCHRG MGMT 30/<: CPT

## 2017-09-21 RX ADMIN — Medication 1 TABLET(S): at 09:58

## 2017-09-21 RX ADMIN — Medication 500 MILLIGRAM(S): at 09:59

## 2017-09-21 RX ADMIN — Medication 5 MILLIGRAM(S): at 13:06

## 2017-09-21 RX ADMIN — TAMOXIFEN CITRATE 20 MILLIGRAM(S): 20 TABLET, FILM COATED ORAL at 09:59

## 2017-09-21 RX ADMIN — Medication 5 MILLIGRAM(S): at 09:58

## 2017-09-21 RX ADMIN — OLANZAPINE 5 MILLIGRAM(S): 15 TABLET, FILM COATED ORAL at 09:58

## 2017-09-21 RX ADMIN — Medication 1 APPLICATION(S): at 09:59

## 2017-09-21 RX ADMIN — PANTOPRAZOLE SODIUM 40 MILLIGRAM(S): 20 TABLET, DELAYED RELEASE ORAL at 09:58

## 2017-09-21 RX ADMIN — Medication 500 MILLIGRAM(S): at 13:06

## 2017-09-21 RX ADMIN — Medication 1000 UNIT(S): at 09:58

## 2017-12-05 ENCOUNTER — APPOINTMENT (OUTPATIENT)
Age: 57
End: 2017-12-05

## 2017-12-13 ENCOUNTER — APPOINTMENT (OUTPATIENT)
Dept: PHYSICAL MEDICINE AND REHAB | Facility: CLINIC | Age: 57
End: 2017-12-13
Payer: MEDICARE

## 2017-12-13 VITALS
WEIGHT: 187 LBS | HEART RATE: 73 BPM | BODY MASS INDEX: 37.7 KG/M2 | TEMPERATURE: 98.8 F | SYSTOLIC BLOOD PRESSURE: 128 MMHG | HEIGHT: 59 IN | OXYGEN SATURATION: 99 % | DIASTOLIC BLOOD PRESSURE: 84 MMHG

## 2017-12-13 DIAGNOSIS — M25.78 OSTEOPHYTE, VERTEBRAE: ICD-10-CM

## 2017-12-13 DIAGNOSIS — M54.2 CERVICALGIA: ICD-10-CM

## 2017-12-13 PROCEDURE — 99204 OFFICE O/P NEW MOD 45 MIN: CPT | Mod: GC

## 2017-12-13 RX ORDER — ACETAMINOPHEN 500 MG/1
500 TABLET, COATED ORAL
Qty: 90 | Refills: 2 | Status: ACTIVE | COMMUNITY
Start: 2017-12-13 | End: 1900-01-01

## 2017-12-13 RX ORDER — DIVALPROEX SODIUM 500 MG/1
500 TABLET, DELAYED RELEASE ORAL
Refills: 0 | Status: ACTIVE | COMMUNITY

## 2017-12-13 RX ORDER — TAMOXIFEN CITRATE 20 MG/1
20 TABLET, FILM COATED ORAL
Refills: 0 | Status: ACTIVE | COMMUNITY

## 2017-12-13 RX ORDER — OLANZAPINE 20 MG/1
TABLET, ORALLY DISINTEGRATING ORAL
Refills: 0 | Status: ACTIVE | COMMUNITY

## 2017-12-13 RX ORDER — METRONIDAZOLE 500 MG/1
5 TABLET ORAL
Refills: 0 | Status: ACTIVE | COMMUNITY

## 2017-12-13 RX ORDER — TRAZODONE HCL 50 MG
50 TABLET ORAL
Refills: 0 | Status: ACTIVE | COMMUNITY

## 2018-04-18 ENCOUNTER — APPOINTMENT (OUTPATIENT)
Dept: PHYSICAL MEDICINE AND REHAB | Facility: CLINIC | Age: 58
End: 2018-04-18
Payer: MEDICARE

## 2018-04-18 VITALS
HEART RATE: 82 BPM | SYSTOLIC BLOOD PRESSURE: 138 MMHG | TEMPERATURE: 97.8 F | OXYGEN SATURATION: 98 % | DIASTOLIC BLOOD PRESSURE: 87 MMHG

## 2018-04-18 DIAGNOSIS — M24.562 CONTRACTURE, LEFT KNEE: ICD-10-CM

## 2018-04-18 PROCEDURE — 99214 OFFICE O/P EST MOD 30 MIN: CPT

## 2018-04-18 RX ORDER — CELECOXIB 200 MG/1
200 CAPSULE ORAL
Qty: 14 | Refills: 0 | Status: COMPLETED | COMMUNITY
Start: 2017-12-13 | End: 2018-04-18

## 2018-06-12 ENCOUNTER — INPATIENT (INPATIENT)
Facility: HOSPITAL | Age: 58
LOS: 19 days | Discharge: HOME | End: 2018-07-02
Attending: PSYCHIATRY & NEUROLOGY | Admitting: PSYCHIATRY & NEUROLOGY
Payer: MEDICARE

## 2018-06-12 VITALS
TEMPERATURE: 99 F | RESPIRATION RATE: 16 BRPM | HEART RATE: 73 BPM | SYSTOLIC BLOOD PRESSURE: 130 MMHG | DIASTOLIC BLOOD PRESSURE: 82 MMHG

## 2018-06-12 DIAGNOSIS — F25.0 SCHIZOAFFECTIVE DISORDER, BIPOLAR TYPE: ICD-10-CM

## 2018-06-12 DIAGNOSIS — F25.9 SCHIZOAFFECTIVE DISORDER, UNSPECIFIED: ICD-10-CM

## 2018-06-12 LAB
ALBUMIN SERPL ELPH-MCNC: 3.5 G/DL — SIGNIFICANT CHANGE UP (ref 3.3–5)
ALP SERPL-CCNC: 52 U/L — SIGNIFICANT CHANGE UP (ref 40–120)
ALT FLD-CCNC: 8 U/L — SIGNIFICANT CHANGE UP (ref 4–33)
APAP SERPL-MCNC: < 15 UG/ML — LOW (ref 15–25)
APPEARANCE UR: SIGNIFICANT CHANGE UP
AST SERPL-CCNC: 19 U/L — SIGNIFICANT CHANGE UP (ref 4–32)
BACTERIA # UR AUTO: HIGH
BASOPHILS # BLD AUTO: 0.03 K/UL — SIGNIFICANT CHANGE UP (ref 0–0.2)
BASOPHILS NFR BLD AUTO: 0.4 % — SIGNIFICANT CHANGE UP (ref 0–2)
BILIRUB SERPL-MCNC: 0.5 MG/DL — SIGNIFICANT CHANGE UP (ref 0.2–1.2)
BILIRUB UR-MCNC: NEGATIVE — SIGNIFICANT CHANGE UP
BLOOD UR QL VISUAL: NEGATIVE — SIGNIFICANT CHANGE UP
BUN SERPL-MCNC: 16 MG/DL — SIGNIFICANT CHANGE UP (ref 7–23)
CALCIUM SERPL-MCNC: 8.7 MG/DL — SIGNIFICANT CHANGE UP (ref 8.4–10.5)
CHLORIDE SERPL-SCNC: 103 MMOL/L — SIGNIFICANT CHANGE UP (ref 98–107)
CO2 SERPL-SCNC: 24 MMOL/L — SIGNIFICANT CHANGE UP (ref 22–31)
COLOR SPEC: YELLOW — SIGNIFICANT CHANGE UP
CREAT SERPL-MCNC: 0.93 MG/DL — SIGNIFICANT CHANGE UP (ref 0.5–1.3)
EOSINOPHIL # BLD AUTO: 0.17 K/UL — SIGNIFICANT CHANGE UP (ref 0–0.5)
EOSINOPHIL NFR BLD AUTO: 2 % — SIGNIFICANT CHANGE UP (ref 0–6)
ETHANOL BLD-MCNC: < 10 MG/DL — SIGNIFICANT CHANGE UP
GLUCOSE SERPL-MCNC: 103 MG/DL — HIGH (ref 70–99)
GLUCOSE UR-MCNC: NEGATIVE — SIGNIFICANT CHANGE UP
HCT VFR BLD CALC: 39.4 % — SIGNIFICANT CHANGE UP (ref 34.5–45)
HGB BLD-MCNC: 12.6 G/DL — SIGNIFICANT CHANGE UP (ref 11.5–15.5)
IMM GRANULOCYTES # BLD AUTO: 0.02 # — SIGNIFICANT CHANGE UP
IMM GRANULOCYTES NFR BLD AUTO: 0.2 % — SIGNIFICANT CHANGE UP (ref 0–1.5)
KETONES UR-MCNC: NEGATIVE — SIGNIFICANT CHANGE UP
LEUKOCYTE ESTERASE UR-ACNC: HIGH
LITHIUM SERPL-MCNC: 0.82 MMOL/L — SIGNIFICANT CHANGE UP (ref 0.6–1.2)
LYMPHOCYTES # BLD AUTO: 3.24 K/UL — SIGNIFICANT CHANGE UP (ref 1–3.3)
LYMPHOCYTES # BLD AUTO: 37.9 % — SIGNIFICANT CHANGE UP (ref 13–44)
MCHC RBC-ENTMCNC: 32 % — SIGNIFICANT CHANGE UP (ref 32–36)
MCHC RBC-ENTMCNC: 34.1 PG — HIGH (ref 27–34)
MCV RBC AUTO: 106.8 FL — HIGH (ref 80–100)
MONOCYTES # BLD AUTO: 0.78 K/UL — SIGNIFICANT CHANGE UP (ref 0–0.9)
MONOCYTES NFR BLD AUTO: 9.1 % — SIGNIFICANT CHANGE UP (ref 2–14)
NEUTROPHILS # BLD AUTO: 4.31 K/UL — SIGNIFICANT CHANGE UP (ref 1.8–7.4)
NEUTROPHILS NFR BLD AUTO: 50.4 % — SIGNIFICANT CHANGE UP (ref 43–77)
NITRITE UR-MCNC: POSITIVE — HIGH
NRBC # FLD: 0 — SIGNIFICANT CHANGE UP
PH UR: 6.5 — SIGNIFICANT CHANGE UP (ref 4.6–8)
PLATELET # BLD AUTO: 138 K/UL — LOW (ref 150–400)
PMV BLD: 10.8 FL — SIGNIFICANT CHANGE UP (ref 7–13)
POTASSIUM SERPL-MCNC: 4.2 MMOL/L — SIGNIFICANT CHANGE UP (ref 3.5–5.3)
POTASSIUM SERPL-SCNC: 4.2 MMOL/L — SIGNIFICANT CHANGE UP (ref 3.5–5.3)
PROT SERPL-MCNC: 6.2 G/DL — SIGNIFICANT CHANGE UP (ref 6–8.3)
PROT UR-MCNC: 10 MG/DL — SIGNIFICANT CHANGE UP
RBC # BLD: 3.69 M/UL — LOW (ref 3.8–5.2)
RBC # FLD: 12.2 % — SIGNIFICANT CHANGE UP (ref 10.3–14.5)
RBC CASTS # UR COMP ASSIST: SIGNIFICANT CHANGE UP (ref 0–?)
SALICYLATES SERPL-MCNC: < 5 MG/DL — LOW (ref 15–30)
SODIUM SERPL-SCNC: 140 MMOL/L — SIGNIFICANT CHANGE UP (ref 135–145)
SP GR SPEC: 1.01 — SIGNIFICANT CHANGE UP (ref 1–1.04)
SQUAMOUS # UR AUTO: SIGNIFICANT CHANGE UP
TSH SERPL-MCNC: 9.68 UIU/ML — HIGH (ref 0.27–4.2)
UROBILINOGEN FLD QL: 1 MG/DL — SIGNIFICANT CHANGE UP
VALPROATE SERPL-MCNC: 111 UG/ML — HIGH (ref 50–100)
WBC # BLD: 8.55 K/UL — SIGNIFICANT CHANGE UP (ref 3.8–10.5)
WBC # FLD AUTO: 8.55 K/UL — SIGNIFICANT CHANGE UP (ref 3.8–10.5)
WBC UR QL: >50 — HIGH (ref 0–?)

## 2018-06-12 PROCEDURE — 99285 EMERGENCY DEPT VISIT HI MDM: CPT | Mod: GC

## 2018-06-12 RX ORDER — PANTOPRAZOLE SODIUM 20 MG/1
40 TABLET, DELAYED RELEASE ORAL
Qty: 0 | Refills: 0 | Status: DISCONTINUED | OUTPATIENT
Start: 2018-06-13 | End: 2018-07-02

## 2018-06-12 RX ORDER — SODIUM CHLORIDE 0.65 %
1 AEROSOL, SPRAY (ML) NASAL
Qty: 0 | Refills: 0 | Status: DISCONTINUED | OUTPATIENT
Start: 2018-06-13 | End: 2018-07-02

## 2018-06-12 RX ORDER — KETOCONAZOLE 20 MG/G
1 AEROSOL, FOAM TOPICAL DAILY
Qty: 0 | Refills: 0 | Status: DISCONTINUED | OUTPATIENT
Start: 2018-06-13 | End: 2018-07-02

## 2018-06-12 NOTE — ED BEHAVIORAL HEALTH ASSESSMENT NOTE - AXIS III
Seizure disorder, hx of breast cancer/right breast mastectomy, GERD, Seborrheic dermatitis, Arthritis and contractures of bilateral knees, hx of urinary incontinence, vitamin D deficiency s/p choking incident, Urinary tract infection, Seizure disorder, hx of breast cancer/right breast mastectomy, GERD, Seborrheic dermatitis, Arthritis and contractures of bilateral knees, hx of urinary incontinence, vitamin D deficiency

## 2018-06-12 NOTE — ED PROVIDER NOTE - MEDICAL DECISION MAKING DETAILS
58 yr old female with hx of seizure disorder, mild MR, seborrheic dermatitis, hx of breast cancer, GERD, Vitamin D deficiency is sent to [facility] ER by staff for Si thoughts.  pt was stating wanting to hurt self by scratching self.  past used scissors.  pt at baseline per staff who came with pt.  pt from group home who is constantly being monitored.  has been hospitalized in past fo psych related issue.  on lithium and depakote    pt with si thoughts- labs, psych eval

## 2018-06-12 NOTE — ED BEHAVIORAL HEALTH ASSESSMENT NOTE - OTHER
staff from [facility]/ RN staff member: Justin Dugan peers CVM [id number] peers and staff [id number]

## 2018-06-12 NOTE — ED BEHAVIORAL HEALTH ASSESSMENT NOTE - HPI (INCLUDE ILLNESS QUALITY, SEVERITY, DURATION, TIMING, CONTEXT, MODIFYING FACTORS, ASSOCIATED SIGNS AND SYMPTOMS)
This is a 57 year old single  female with history of schizoaffective disorder, multiple past psychiatric hospitalizations, no history of suicide attempts, no history of violent behavior, no history of substance abuse, PMH of seizure disorder, mild MR, seborrheic dermatitis, hx of breast cancer, GERD, Vitamin D deficiency is sent to [facility] ER by staff from outpatient psychiatrist appointment due to an increase in erratic behavior in the context of acute psychotic symptoms.  Patient has been taking off clothes in public, is notably disorganized, perseverative on delusional themes related to certain types of music having an impact on her behavior.  Patient has been having more incidents of intrusive behavior in residence and continues to ruminate on random topics with erratic behavior exhibited, as per collateral obtained.  Patient has a history of chronic delusions but of concern is reports of increasingly erratic, dangerous behavior over the past 2 weeks.  As per Sabina Plascencia RN patient has been preoccupied with the stove being on and has tried to put her hands over the fire, has been running into the street and stripping her clothes off as well.  Patient is a poor historian, reportedly has a legal guardian in the community, her brother, and due to her worsening symptoms has had recent medication changes without benefit.  There has been a recent increase in both Zyprexa and Depakote dosage, and a recent addition of Stelazine to regimen but patient remains acutely disorganized, delusional, appears to be acutely psychotic with labile affect, and is exhibiting pressured speech at this time.  As per note by Psych NP in the community, conversation with RN at residence and d/w staff member patient is reported to be an acute risk to self at this time.  Patient denies SI, no intent, no plan, no gestures, no HI, but is acutely paranoid, delusional, tangential in her thought process with loose associations.  Patient denies AH or VH but does appear to be internally stimulated, denies anxiety or panic attacks but is exhibiting obsessional thinking with ruminations.  Patient has reportedly been medication compliant with depakote level of 108 received consistent with such.  Patient with chronic mental illness of schizoaffective disorder, baseline intellectual disability but at this time is not at baseline with erratic behavior, poor impulse control and acute psychosis evident.  Patient represents an acute risk to self and requires inpatient psychiatric hospitalization for her safety.  D/W RN from the community who supports this plan and states plan to coordinate care with inpatient psych team on Low 4. This is a 58 year old single  female with history of schizoaffective disorder, multiple past psychiatric hospitalizations, most recently to [place] [id number] from 9/12-9/21/17, no history of suicide attempts, no history of violent behavior, no history of substance abuse, PMH of seizure disorder, mild MR, seborrheic dermatitis, hx of breast cancer, GERD, Vitamin D deficiency is sent to [facility] by staff from group home after suicide assessment was performed by clinician at residence due to concerns for suicidal ideation.  Patient has a long history of erratic behavior    Patient has been taking off clothes in public, is notably disorganized, perseverative on delusional themes related to certain types of music having an impact on her behavior.  Patient has been having more incidents of intrusive behavior in residence and continues to ruminate on random topics with erratic behavior exhibited, as per collateral obtained.  Patient has a history of chronic delusions but of concern is reports of increasingly erratic, dangerous behavior over the past 2 weeks.  As per Sabina Plascencia RN patient has been preoccupied with the stove being on and has tried to put her hands over the fire, has been running into the street and stripping her clothes off as well.  Patient is a poor historian, reportedly has a legal guardian in the community, her brother, and due to her worsening symptoms has had recent medication changes without benefit.  There has been a recent increase in both Zyprexa and Depakote dosage, and a recent addition of Stelazine to regimen but patient remains acutely disorganized, delusional, appears to be acutely psychotic with labile affect, and is exhibiting pressured speech at this time.  As per note by Psych NP in the community, conversation with RN at residence and d/w staff member patient is reported to be an acute risk to self at this time.  Patient denies SI, no intent, no plan, no gestures, no HI, but is acutely paranoid, delusional, tangential in her thought process with loose associations.  Patient denies AH or VH but does appear to be internally stimulated, denies anxiety or panic attacks but is exhibiting obsessional thinking with ruminations.  Patient has reportedly been medication compliant with depakote level of 108 received consistent with such.  Patient with chronic mental illness of schizoaffective disorder, baseline intellectual disability but at this time is not at baseline with erratic behavior, poor impulse control and acute psychosis evident.  Patient represents an acute risk to self and requires inpatient psychiatric hospitalization for her safety.  D/W RN from the community who supports this plan and states plan to coordinate care with inpatient psych team on Low 4. This is a 58 year old single  female with history of schizoaffective disorder, multiple past psychiatric hospitalizations, most recently to [place] [id number] from 9/12-9/21/17, no history of suicide attempts, no history of violent behavior, no history of substance abuse, PMH of seizure disorder, mild MR, seborrheic dermatitis, hx of breast cancer, GERD, Vitamin D deficiency is sent to [facility] by staff from group home after suicide assessment was performed by clinician at residence due to concerns for suicidal ideation.  Patient has a long history of erratic behavior and in the past 2 weeks there is a reported escalation of behavior.  Patient is notably disorganized, perseverative on delusional themes related to celebrities being related to her, during in interview ruminating on Carlos Sears, the actor.  Patient has a history of chronic delusions but of concern is reports of increasingly erratic, dangerous behavior over the past 2 weeks, now acutely endorsing acute SI to slit her wrist with a scissor which patient has access to at the residence.  Patient reports being depressed, states shes "unhappy" related to reported loss of uncle, states suicidal ideation and has exhibited self injurious behavior prior to arrival by recent scratching self, including her face.  As per Sabina Plascencia RN patient patient's current presentation with acute SI is a departure from her baseline and expresses acute safety concerns as patient has access to a full kitchen, including scissors and sharp objects at residence.  Patient is a poor historian, reportedly has a legal guardian in the community, her brother, and due to her worsening symptoms has had recent medication changes without benefit.  There has been a recent increase in both Zyprexa and Depakote dosage, and a recent addition of Stelazine to regimen but patient remains acutely disorganized, delusional, appears to be acutely psychotic with labile affect, and is exhibiting pressured speech at this time.  As per note by Psych NP in the community, conversation with RN at residence and d/w staff member patient is reported to be an acute risk to self at this time.  Patient denies SI, no intent, no plan, no gestures, no HI, but is acutely paranoid, delusional, tangential in her thought process with loose associations.  Patient denies AH or VH but does appear to be internally stimulated, denies anxiety or panic attacks but is exhibiting obsessional thinking with ruminations.  Patient has reportedly been medication compliant with depakote level of 108 received consistent with such.  Patient with chronic mental illness of schizoaffective disorder, baseline intellectual disability but at this time is not at baseline with erratic behavior, poor impulse control and acute psychosis evident.  Patient represents an acute risk to self and requires inpatient psychiatric hospitalization for her safety.  D/W RN from the community who supports this plan and states plan to coordinate care with inpatient psych team on Low 4. This is a 58 year old single  female with history of schizoaffective disorder, multiple past psychiatric hospitalizations, most recently to [place] [id number] from 9/12-9/21/17, no history of suicide attempts, no history of violent behavior, no history of substance abuse, PMH of seizure disorder, mild MR, seborrheic dermatitis, hx of breast cancer, GERD, Vitamin D deficiency is sent to [facility] ER by staff from group home after suicide assessment was performed by clinician at residence due to concerns for suicidal ideation.  Patient has a long history of erratic behavior and in the past 2 weeks there is a reported escalation of behavior.  Patient is notably disorganized, perseverative on delusional themes related to celebrities being related to her, during in interview ruminating on Carlos Sears, the actor.  Patient has a history of chronic delusions but of concern is reports of increasingly erratic, dangerous behavior over the past 2 weeks, now acutely endorsing acute SI to slit her wrist with a scissor which patient has access to at the residence.  Patient reports being depressed, states shes "unhappy" related to reported loss of uncle, states suicidal ideation and has exhibited self injurious behavior prior to arrival by recent scratching self, including her face.  As per Sabina Plascencia RN patient patient's current presentation with acute SI is a departure from her baseline and expresses acute safety concerns as patient has access to a full kitchen, including scissors and sharp objects at residence.  Patient is a poor historian, reportedly has a legal guardian in the community, her brother, and due to her worsening symptoms is brought to the ER for her safety.  As per review of records, conversation with RN at residence and d/w staff member patient is reported to be an acute risk to self at this time.  Patient endorses acute SI, stated plan to slit her wrist with noted self injurious gestures evident of scratching self, including her face, unclear intent but patient is unable to safety contract for safety in the community but denies intent in a controlled setting.  Patient denies HI with no acute agitation noted, but is acutely paranoid, delusional, tangential in her thought process with loose associations.  Patient denies AH or VH but does appear to be internally stimulated, denies anxiety or panic attacks but is exhibiting obsessional thinking with ruminations.  Patient has reportedly been medication compliant with depakote level of 111 and Lithium level of 0.82 consistent with such.  Patient with chronic mental illness of schizoaffective disorder, baseline intellectual disability but at this time is not at baseline with acute SI, stated plan, self injurious behavior with poor impulse control and acute psychosis evident.  Patient represents an acute risk to self and requires inpatient psychiatric hospitalization for her safety.  D/W RN and staff from the community who supports this plan and states plan to coordinate care with inpatient psych team on Low 6. This is a 58 year old single  female with history of schizoaffective disorder, multiple past psychiatric hospitalizations, most recently [facility] Low 4 from 9/12-9/21/17, no history of suicide attempts, no history of violent behavior, no history of substance abuse, PMH of seizure disorder, mild MR, seborrheic dermatitis, hx of breast cancer, GERD, Vitamin D deficiency is sent to [facility] ER by staff from group home after suicide assessment was performed by clinician at residence due to concerns for suicidal ideation.  Patient has a history of chronic delusions but of concern is reports of increasingly erratic, dangerous behavior over the past 2 weeks, now acutely endorsing acute SI to slit her wrist with a scissor which patient has access to at the residence.  Patient reports being depressed, states shes "unhappy" related to reported loss of uncle, states suicidal ideation and has exhibited self injurious behavior prior to arrival by recent scratching self, including her face.  As per documentation from clinician's evaluation earlier patient  reported acute SI and intent as a means to avoid [facility] for social security fraud, which is a delusional theme.  Patient reportedly "told [facility]IS that she would cut her wrist with a scissor or a knife because she doesn't want to live because she has no life anymore."  During assessment patient reports precipitant being loss of family members and feeling of isolation.  Although patient has a long history of erratic behavior and in the past 2 weeks there is a reported escalation of behavior as per collateral obtained.  Patient is notably disorganized, perseverative on delusional themes related to celebrities being related to her, during in interview ruminating on Carlos Sears, the actor. As per Sabina Plascencia RN patient patient's current presentation with acute SI is a departure from her baseline and expresses acute safety concerns as patient has access to a full kitchen, including scissors and sharp objects at residence.  Patient is a poor historian, reportedly has a legal guardian in the community, her brother, and due to her worsening symptoms is brought to the ER for her safety.  As per review of records, conversation with RN at residence and d/w staff member patient is reported to be an acute risk to self at this time.  Patient endorses acute SI, stated plan to slit her wrist with noted self injurious gestures evident of scratching self, including her face, unclear intent but patient is unable to safety contract for safety in the community but denies intent in a controlled setting.  Patient denies HI with no acute agitation noted, but is acutely paranoid, delusional, tangential in her thought process with loose associations.  Patient denies AH or VH but does appear to be internally stimulated, denies anxiety or panic attacks but is exhibiting obsessional thinking with ruminations.  Patient has reportedly been medication compliant with depakote level of 111 and Lithium level of 0.82 consistent with such.  Patient with chronic mental illness of schizoaffective disorder, baseline intellectual disability but at this time is not at baseline with acute SI, stated plan, self injurious behavior with poor impulse control and acute psychosis evident.  Patient represents an acute risk to self and requires inpatient psychiatric hospitalization for her safety.  D/W RN and staff from the community who supports this plan and states plan to coordinate care with inpatient psych team on Low 6. This is a 58 year old single  female with history of schizoaffective disorder, multiple past psychiatric hospitalizations, most recently [facility] Low 4 from 9/12-9/21/17, no history of suicide attempts, no history of violent behavior, no history of substance abuse, PMH of seizure disorder, mild MR, seborrheic dermatitis, hx of breast cancer, GERD, Vitamin D deficiency is sent to [facility] ER by staff from group home after suicide assessment was performed by clinician at residence due to concerns for suicidal ideation.  Patient had reportedly endorsed suicidal ideation to staff at day program yesterday which prompted evaluation by behavioral therapist.  Patient has a history of chronic delusions but of concern is reports of increasingly erratic, dangerous behavior over the past 2 weeks, now acutely endorsing acute SI to slit her wrist with a scissor which patient has access to at the residence.  Patient reports being depressed, states shes "unhappy" related to reported loss of uncle, states suicidal ideation and has exhibited self injurious behavior prior to arrival by recent scratching self, including her face.  As per documentation from clinician's evaluation earlier patient  reported acute SI with intent as a means to avoid jail for social security fraud, which is a delusional theme.  Patient reportedly "told BIS that she would cut her wrist with a scissor or a knife because she doesn't want to live because she has no life anymore."  During assessment patient reports precipitant being loss of family members and feeling of isolation.  Although patient has a long history of erratic behavior and in the past 2 weeks there is a reported escalation of behavior as per collateral obtained.  Patient is notably disorganized, perseverative on delusional themes related to celebrities being related to her, during interview ruminating on Carlos Sears, the actor. As per Sabina Plascencia RN patient's current presentation with acute SI is a departure from her baseline and expresses acute safety concerns as patient has access to a full kitchen, including scissors and sharp objects at residence.  Patient is a poor historian, reportedly has a legal guardian in the community, her brother, and due to her worsening symptoms is brought to the ER for her safety.  As per direct individual assessment, review of records, conversation with RN at residence and d/w staff member patient is reported to be an acute risk to self at this time.  Patient endorses acute SI, stated plan to slit her wrist with noted self injurious gestures evident of scratching self, including her face, unclear intent but patient is unable to safety contract for safety in the community but denies intent in a controlled setting.  Patient denies HI with no acute agitation noted, but is acutely paranoid, delusional, tangential in her thought process with loose associations.  Patient denies AH or VH but does appear to be internally stimulated, denies anxiety or panic attacks but is exhibiting obsessional thinking with ruminations.  Patient has reportedly been medication compliant with depakote level of 111 and Lithium level of 0.82 consistent with such.  Patient with chronic mental illness of schizoaffective disorder, baseline intellectual disability but at this time is not at baseline with acute SI, stated plan, self injurious behavior with poor impulse control and acute psychosis evident.  Patient represents an acute risk to self and requires inpatient psychiatric hospitalization for her safety.  D/W RN and staff from the community who supports this plan and states plan to coordinate care with inpatient psych team on Low 6.

## 2018-06-12 NOTE — ED BEHAVIORAL HEALTH ASSESSMENT NOTE - RISK ASSESSMENT
Risk factors include chronic mental illness of schizoaffective disorder with limited insight and baseline cognitive limitations due to mild MR.  Patient with recent acute psychotic symptoms with poor impulse control and precipitating factors seems to be multiple recent medication changes without benefit.  Protective factors seems to be supportive housing environment and staff relationships in place.  Due to acute disorganization with labile affect, paranoid delusions and recent dangerous behaviors exhibited patient represents an acute risk to self at this time. Risk factors include chronic mental illness of schizoaffective disorder with limited insight and baseline cognitive limitations due to mild MR.  Patient with acute SI, recent self injurious behavior, acute psychotic symptoms with poor impulse control and precipitating factors seems to be perceived loss of family members and social isolation.  Protective factors seems to be supportive housing environment and staff relationships in place.  Due to acute suicidal ideation, paranoid delusions and recent dangerous behaviors exhibited patient represents an acute risk to self at this time.

## 2018-06-12 NOTE — ED BEHAVIORAL HEALTH ASSESSMENT NOTE - PSYCHIATRIC RESIDENCE
[facility] residence [facility] residence - [address], in [place], [place] - telephone # [phone]

## 2018-06-12 NOTE — ED BEHAVIORAL HEALTH ASSESSMENT NOTE - SUMMARY
This is a 57 year old single  female with history of schizoaffective disorder, multiple past psychiatric hospitalizations, PMH of seizure disorder, mild MR, seborrheic dermatitis, hx of breast cancer, GERD, Vitamin D deficiency is sent to [facility] ER by staff from outpatient psychiatrist appointment due to an increase in erratic behavior in the context of acute psychotic symptoms.  Patient has been taking off clothes in public, is notably disorganized, perseverative on delusional themes related to certain types of music having an impact on her behavior.  Patient has been having more incidents of intrusive behavior in residence and continues to ruminate on random topics with erratic behavior exhibited, as per collateral obtained.  Patient has been preoccupied with the stove being on and has tried to put her hands over the fire, has been running into the street and stripping off her clothes as well.  Patient is a poor historian and due to her worsening symptoms has had recent medication changes without benefit.  There has been a recent increase in both Zyprexa and Depakote dosage, and a recent addition of Stelazine to regimen but patient remains acutely disorganized, delusional, appears to be acutely psychotic with labile affect, and is exhibiting pressured speech at this time.  Patient is acutely paranoid, delusional, disorganized in her thought process with loose associations.  Patient denies AH or VH but does appear to be internally stimulated, denies anxiety or panic attacks but is exhibiting obsessional thinking with ruminations.  Patient with chronic mental illness of schizoaffective disorder, baseline intellectual disability but at this time is not at baseline with erratic behavior, poor impulse control and acute psychosis evident.  Patient represents an acute risk to self and requires inpatient psychiatric hospitalization for her safety.  D/W RN from the community who supports psychiatric admission and states plan to coordinate care with inpatient psych team on Low 4.  No indication for constant observation in a locked, supervised setting at this time.  EMS transportation to unit arranged with buckle guard for safe transport. 58 year old single  female with history of schizoaffective disorder, multiple past psychiatric hospitalizations, PMH of seizure disorder, mild MR, seborrheic dermatitis, hx of breast cancer, GERD, Vitamin D deficiency is sent to [facility] ER by staff due to acute suicidal ideation in the context of acute psychotic symptoms.      Patient is a poor historian and due to her worsening symptoms as evidenced by endorsed acute SI with self injurious behavior, requires a higher level of care to maintaining safety.  Patient is acutely paranoid, delusional, disorganized in her thought process with loose associations.  Patient denies AH or VH but does appear to be internally stimulated, and is exhibiting obsessional thinking with ruminations.  Patient with chronic mental illness of schizoaffective disorder, baseline intellectual disability but at this time is not at baseline with acute SI with stated plan, poor impulse control and acute psychosis evident.  Patient represents an acute risk to self and requires inpatient psychiatric hospitalization for her safety.  D/W RN and staff from the community who supports psychiatric admission.  No indication for constant observation in a locked, supervised setting at this time.  EMS transportation to unit advised with buckle guard for safe transport once medically cleared. 58 year old single  female with history of schizoaffective disorder, multiple past psychiatric hospitalizations, PMH of seizure disorder, mild MR, seborrheic dermatitis, hx of breast cancer, GERD, Vitamin D deficiency is sent to [facility] ER by staff due to acute suicidal ideation in the context of acute psychotic symptoms.  Patient endorses depressed mood with acute SI with stated plan to slit her wrist with a scissor or knife and collateral indicates patient does have access to such at residence.  Patient is a poor historian and due to her worsening symptoms as evidenced by endorsed acute SI with self injurious behavior, requires a higher level of care to maintaining safety.  Patient is acutely paranoid, delusional, tangential in her thought process with loose associations.  Patient denies AH or VH but does appear to be internally stimulated, and is exhibiting obsessional thinking with ruminations.  Patient with chronic mental illness of schizoaffective disorder, baseline intellectual disability but at this time is not at baseline with acute SI with stated plan, poor impulse control and acute psychosis evident.  Patient represents an acute risk to self and requires inpatient psychiatric hospitalization for her safety.  D/W RN and staff from the community who supports psychiatric admission.  No indication for constant observation in a locked, supervised setting at this time.  EMS transportation to unit advised with buckle guard for safe transport once medically cleared.

## 2018-06-12 NOTE — ED BEHAVIORAL HEALTH ASSESSMENT NOTE - CURRENT MEDICATION
Zyprexa 5mg PO AM and 20mg HS, Stelazine 2mg PO BID, Depakote DR 500mg PO TID, Remeron 15mg PO QHS, Trazodone 50mg PO QHS prn, Tamoxifen 20mg daily, Omeprazole 20mg daily, Ditropan 5mg TID, Calcium 500mg + Vitamin D tablet daily, Vitamin D3 1000IU daily, Lactaid 3000units PO daily with any dairy product, Selsun blue 1% topically for scalp daily, Ketoconazole 2% cream apply topically to face daily, Sodium chloride nasal spray 2 sprays each nostril BID, Boost pudding 5% one can daily Zyprexa 30mg q8pm, Depakote DR 500mg PO TID, Lithium 300mg BID, Trazodone 50mg PO QHS prn, Tamoxifen 20mg daily, Omeprazole 20mg daily, Ditropan 5mg TID, Vitamin D3 1000IU daily, Ketoconazole 2% cream apply topically to face daily, Selenium Sulfide 2.5% lotion apply to skin daily, Sodium chloride nasal spray 2 sprays each nostril BID

## 2018-06-12 NOTE — ED BEHAVIORAL HEALTH ASSESSMENT NOTE - MEDICAL ISSUES AND PLAN (INCLUDE STANDING AND PRN MEDICATION)
Ambulate with walker as per outpatient treatment plan, PT consult ordered, will restart outpatient medications as per MAR obtained with treatment for seizure disorder, GERD, vitamin D deficiency provided.  Patient is medically cleared as per EM team. Will restart outpatient medications as per MAR obtained with treatment for seizure disorder, overactive bladder, GERD, hx of breast cancer, vitamin D deficiency provided, including Depakote, Ditropan, Prilosec, Tamoxifen, Vitamin D3.  Patient is medically cleared as per EM team and will continue treatment for UTI of Bactrim DS 1 tablet BID x 3 days as advised by EM provider. Will restart outpatient medications as per MAR obtained with treatment for seizure disorder, overactive bladder, GERD, hx of breast cancer, vitamin D deficiency provided, including Depakote, Ditropan, Prilosec, Tamoxifen, Vitamin D3.  Patient is medically cleared as per EM team and will continue treatment for UTI of Bactrim DS 1 tablet BID x 3 days as advised by EM provider.  Patient had episode of choking, being further assessed medically, care coordinated with Dr. Reza.

## 2018-06-12 NOTE — ED PROVIDER NOTE - PROGRESS NOTE DETAILS
Controlled on medication outpatient  -losartan 100 QD outpatient, will hold for now due to acute renal insufficiency  -will c/t monitor overnight and consider adding PO regimen of CCB if needed flanagan: pt has uti on lab. no cva tenderness.  will give bactrim DS BID x 3 days since pt is PCN allergic. informed Psych ed attending  admit to  for schizoaffective d/o.  bactrim DS BID x 3 days for uti. medically cleared flanagan: pt was eating in psych and started to choke on her food (rice and beans) pt received heimlich by staff and cough up rice.  never loss conscious.  no cyanosis.  pt speakin in ed.  mild stridor now resolved.  ABC's intact.  in main- will have ent see and scope pt. Greene: scoped by ENT and no foreign body.  rec to have speech and swallow eval pt.  pt tolerated bactrim  continue ok to go to [facility].  informed psych team - C/W home meds w/parameters  - Amlodipine 5mg daily  - Clonidine 0.3mg Q12h  - Carvedilol 12.5 Q12h  - Hydralazine 50mg Q8h  - Hydrochlorothizide 25mg daily  - Losartan 100mg daily

## 2018-06-12 NOTE — ED BEHAVIORAL HEALTH ASSESSMENT NOTE - SUICIDE RISK FACTORS
Highly impulsive behavior/Mood episode/Agitation/severe anxiety Unable to engage in safety planning/Highly impulsive behavior/Mood episode/Agitation/severe anxiety

## 2018-06-12 NOTE — ED BEHAVIORAL HEALTH ASSESSMENT NOTE - DETAILS
Penicillin d/w Sabina Plascencia RN Handoff to Dr. Morel completed. reports acute SI with stated plan to slit wrist, reports intent in the community, noted self injurious behavior of scratching self, including face Penicillin, food allergies documented to seafood, milk superficial scratch noted to face medically cleared as per EM provider, no acute somatic complaints with Rx for UTI advised Handoff to [facility], Dr. Morel completed. d/w staff member, Justin, and Sabina Plascencia RN

## 2018-06-12 NOTE — ED PROVIDER NOTE - OBJECTIVE STATEMENT
58 yr old female with hx of seizure disorder, mild MR, seborrheic dermatitis, hx of breast cancer, GERD, Vitamin D deficiency is sent to [facility] ER by staff for Si thoughts.  pt was stating wanting to hurt self by scratching self.  past used scissors.  pt at baseline per staff who came with pt.  pt from group home who is constantly being monitored.  has been hospitalized in past fo psych related issue.  on lithium and depakote

## 2018-06-12 NOTE — ED BEHAVIORAL HEALTH ASSESSMENT NOTE - DESCRIPTION
calm, cooperative, no prns required during ED course  Vital Signs Last 24 Hrs  T(C): 37.1 (12 Jun 2018 18:42), Max: 37.1 (12 Jun 2018 18:42)  T(F): 98.8 (12 Jun 2018 18:42), Max: 98.8 (12 Jun 2018 18:42)  HR: 73 (12 Jun 2018 18:42) (73 - 73)  BP: 130/82 (12 Jun 2018 18:42) (130/82 - 130/82)  BP(mean): --  RR: 16 (12 Jun 2018 18:42) (16 - 16)  SpO2: -- Seborrheic dermatitis, GERD, mild MR, hx of breast cancer, osteoporosis/osteoarthritis, Vitamin D deficiency, urinary incontinence lives at [facility] residence located at [address] [place] [address] in [place], [place], has been living there for over 5 years.  Reportedly has a legal guardian, brother, as per d/w RN, Sabina Plascencia. calm, cooperative, no prns required during ED course    Vital Signs Last 24 Hrs  T(C): 37.1 (12 Jun 2018 18:42), Max: 37.1 (12 Jun 2018 18:42)  T(F): 98.8 (12 Jun 2018 18:42), Max: 98.8 (12 Jun 2018 18:42)  HR: 73 (12 Jun 2018 18:42) (73 - 73)  BP: 130/82 (12 Jun 2018 18:42) (130/82 - 130/82)  BP(mean): --  RR: 16 (12 Jun 2018 18:42) (16 - 16)  SpO2: -- calm, cooperative, no prns required during ED course.  Patient notably had an episode of choking with further management by EM team, see further documentation in chart.    Vital Signs Last 24 Hrs  T(C): 37.1 (12 Jun 2018 18:42), Max: 37.1 (12 Jun 2018 18:42)  T(F): 98.8 (12 Jun 2018 18:42), Max: 98.8 (12 Jun 2018 18:42)  HR: 73 (12 Jun 2018 18:42) (73 - 73)  BP: 130/82 (12 Jun 2018 18:42) (130/82 - 130/82)  BP(mean): --  RR: 16 (12 Jun 2018 18:42) (16 - 16)  SpO2: -- calm, cooperative, no prns required during ED course.  Patient started treatment for UTI as per EM provider with Bactrim DS x 1 PO administered.  Patient notably had an episode of choking with further management by EM team, see further documentation in chart.    Vital Signs Last 24 Hrs  T(C): 37.1 (12 Jun 2018 18:42), Max: 37.1 (12 Jun 2018 18:42)  T(F): 98.8 (12 Jun 2018 18:42), Max: 98.8 (12 Jun 2018 18:42)  HR: 73 (12 Jun 2018 18:42) (73 - 73)  BP: 130/82 (12 Jun 2018 18:42) (130/82 - 130/82)  BP(mean): --  RR: 16 (12 Jun 2018 18:42) (16 - 16)  SpO2: -- lives at [facility] residence located at [address] [place] [address] in [place], [place], has been living there for over 5 years, total of 6 patients reside there.  Reportedly has a legal guardian, brother, as per past records.. calm, cooperative, no prns required during ED course.  Patient started treatment for UTI as per EM provider with Bactrim DS x 1 PO ordered.  Patient notably had an episode of choking with further management by EM team, see further documentation in chart.    Vital Signs Last 24 Hrs  T(C): 37.1 (12 Jun 2018 18:42), Max: 37.1 (12 Jun 2018 18:42)  T(F): 98.8 (12 Jun 2018 18:42), Max: 98.8 (12 Jun 2018 18:42)  HR: 73 (12 Jun 2018 18:42) (73 - 73)  BP: 130/82 (12 Jun 2018 18:42) (130/82 - 130/82)  BP(mean): --  RR: 16 (12 Jun 2018 18:42) (16 - 16)  SpO2: --

## 2018-06-12 NOTE — ED BEHAVIORAL HEALTH ASSESSMENT NOTE - PSYCHIATRIC ISSUES AND PLAN (INCLUDE STANDING AND PRN MEDICATION)
Will restart Zyprexa 5mg in AM and 20mg HS for psychosis, discontinue Stelazine to avoid polypharmacy that has not been beneficial.  Restart Depakote DR 500mg TID at outpatient regimen.  Will temporarily restart Remeron 15mg HS, but d/c Trazodone and consider d/c of Remeron as well due to potential activating effect.  Provided Ativan 1mg PO q6hr prn anxiety and Zyprexa 5mg IM prn severe psychotic agitation. Will restart Zyprexa 30mg HS for psychosis.  Restart Depakote DR 500mg TID at outpatient regimen.  Will continue Trazodone 50mg HS prn insomnia.  Provided Ativan 1mg PO q6hr prn anxiety and Zyprexa 5mg IM prn severe psychotic agitation. Will restart Zyprexa 30mg HS for psychosis.  Restart Depakote DR 500mg TID and Lithium 300mg BID at outpatient regimen.  Will continue Trazodone 50mg HS prn insomnia.  Provided Ativan 1mg PO q6hr prn anxiety and Ativan 2mg IM prn severe psychotic agitation. Will restart Zyprexa 30mg HS for psychosis.  Restart Depakote DR 500mg TID and Lithium 300mg BID at outpatient regimen.  Provided Ativan 1mg PO q6hr prn anxiety and Ativan 1mg IM prn severe psychotic agitation.

## 2018-06-12 NOTE — ED ADULT NURSE NOTE - OBJECTIVE STATEMENT
Pt received in  c/o SI, brought in from group home. Pt continues to endorse SI in  with plan to cut self with razor blade and scissors. Self inflicted scratches to face noted. Per Aide, pt has deviated markedly from baseline. Pt denies HI&AH, denies ETOH and substance use. psych eval ongoing

## 2018-06-12 NOTE — ED ADULT TRIAGE NOTE - CHIEF COMPLAINT QUOTE
pt from group home states she wants to kill herself. pt has self inflicted scratch on rt side of face

## 2018-06-12 NOTE — ED BEHAVIORAL HEALTH ASSESSMENT NOTE - NS ED BHA PLAN ADMIT TO PSYCHIATRY BH CONTACTED FT
after hours, but reviewed note from Shruti Spencer NP, message left after hours, but clinical staff informed.

## 2018-06-12 NOTE — ED BEHAVIORAL HEALTH ASSESSMENT NOTE - OTHER PAST PSYCHIATRIC HISTORY (INCLUDE DETAILS REGARDING ONSET, COURSE OF ILLNESS, INPATIENT/OUTPATIENT TREATMENT)
History of documented schizoaffective disorder, generalized anxiety disorder, PTSD, mild intellectual disabilities who sees psychiatric NP Shruti Spencer at [facility], most recently today on 9/12 for a crisis appointment.  Patient with multiple psychiatric hospitalizations including most recently on 4/19/17-5/11/17 at [facility].  No reported history of suicide attempts. History of documented schizoaffective disorder, generalized anxiety disorder, PTSD, mild intellectual disabilities who sees psychiatric NP Shruti Spencer at [facility], most recently today saw clinician CLAUDETTE Moy MA for suicide assessment on 6/12/18.  Patient with multiple psychiatric hospitalizations including most recently at [facility] 4 from 9/12-9/21/17, prior on 4/19/17-5/11/17 at [facility].  No reported history of suicide attempts. History of documented schizoaffective disorder, generalized anxiety disorder, PTSD, mild intellectual disabilities who sees psychiatric NP Shruti Spencer at [facility], attends a day program, most recently today saw clinician CLAUDETTE Moy MA for suicide assessment on 6/12/18.  Patient with multiple psychiatric hospitalizations including most recently at [facility] 4 from 9/12-9/21/17, prior on 4/19/17-5/11/17 at [facility].  No reported history of suicide attempts.

## 2018-06-13 PROCEDURE — 99223 1ST HOSP IP/OBS HIGH 75: CPT

## 2018-06-13 RX ORDER — TAMOXIFEN CITRATE 20 MG/1
20 TABLET, FILM COATED ORAL DAILY
Qty: 0 | Refills: 0 | Status: DISCONTINUED | OUTPATIENT
Start: 2018-06-13 | End: 2018-07-02

## 2018-06-13 RX ORDER — OXYBUTYNIN CHLORIDE 5 MG
5 TABLET ORAL THREE TIMES A DAY
Qty: 0 | Refills: 0 | Status: DISCONTINUED | OUTPATIENT
Start: 2018-06-13 | End: 2018-06-13

## 2018-06-13 RX ORDER — CHOLECALCIFEROL (VITAMIN D3) 125 MCG
1000 CAPSULE ORAL DAILY
Qty: 0 | Refills: 0 | Status: DISCONTINUED | OUTPATIENT
Start: 2018-06-13 | End: 2018-06-13

## 2018-06-13 RX ORDER — CHOLECALCIFEROL (VITAMIN D3) 125 MCG
1000 CAPSULE ORAL DAILY
Qty: 0 | Refills: 0 | Status: DISCONTINUED | OUTPATIENT
Start: 2018-06-13 | End: 2018-07-02

## 2018-06-13 RX ORDER — OXYBUTYNIN CHLORIDE 5 MG
5 TABLET ORAL THREE TIMES A DAY
Qty: 0 | Refills: 0 | Status: DISCONTINUED | OUTPATIENT
Start: 2018-06-13 | End: 2018-07-02

## 2018-06-13 RX ORDER — TAMOXIFEN CITRATE 20 MG/1
20 TABLET, FILM COATED ORAL DAILY
Qty: 0 | Refills: 0 | Status: DISCONTINUED | OUTPATIENT
Start: 2018-06-13 | End: 2018-06-13

## 2018-06-13 RX ORDER — OLANZAPINE 15 MG/1
30 TABLET, FILM COATED ORAL AT BEDTIME
Qty: 0 | Refills: 0 | Status: DISCONTINUED | OUTPATIENT
Start: 2018-06-13 | End: 2018-07-02

## 2018-06-13 RX ORDER — DIVALPROEX SODIUM 500 MG/1
500 TABLET, DELAYED RELEASE ORAL THREE TIMES A DAY
Qty: 0 | Refills: 0 | Status: DISCONTINUED | OUTPATIENT
Start: 2018-06-13 | End: 2018-07-02

## 2018-06-13 RX ORDER — LITHIUM CARBONATE 300 MG/1
300 TABLET, EXTENDED RELEASE ORAL
Qty: 0 | Refills: 0 | Status: DISCONTINUED | OUTPATIENT
Start: 2018-06-13 | End: 2018-07-02

## 2018-06-13 RX ORDER — DIVALPROEX SODIUM 500 MG/1
500 TABLET, DELAYED RELEASE ORAL THREE TIMES A DAY
Qty: 0 | Refills: 0 | Status: DISCONTINUED | OUTPATIENT
Start: 2018-06-13 | End: 2018-06-13

## 2018-06-13 RX ORDER — OLANZAPINE 15 MG/1
30 TABLET, FILM COATED ORAL AT BEDTIME
Qty: 0 | Refills: 0 | Status: DISCONTINUED | OUTPATIENT
Start: 2018-06-13 | End: 2018-06-13

## 2018-06-13 RX ORDER — LITHIUM CARBONATE 300 MG/1
300 TABLET, EXTENDED RELEASE ORAL
Qty: 0 | Refills: 0 | Status: DISCONTINUED | OUTPATIENT
Start: 2018-06-13 | End: 2018-06-13

## 2018-06-13 RX ADMIN — OLANZAPINE 30 MILLIGRAM(S): 15 TABLET, FILM COATED ORAL at 21:28

## 2018-06-13 RX ADMIN — DIVALPROEX SODIUM 500 MILLIGRAM(S): 500 TABLET, DELAYED RELEASE ORAL at 12:53

## 2018-06-13 RX ADMIN — Medication 1 TABLET(S): at 00:30

## 2018-06-13 RX ADMIN — LITHIUM CARBONATE 300 MILLIGRAM(S): 300 TABLET, EXTENDED RELEASE ORAL at 21:28

## 2018-06-13 RX ADMIN — Medication 1000 UNIT(S): at 09:21

## 2018-06-13 RX ADMIN — Medication 5 MILLIGRAM(S): at 09:21

## 2018-06-13 RX ADMIN — PANTOPRAZOLE SODIUM 40 MILLIGRAM(S): 20 TABLET, DELAYED RELEASE ORAL at 09:21

## 2018-06-13 RX ADMIN — DIVALPROEX SODIUM 500 MILLIGRAM(S): 500 TABLET, DELAYED RELEASE ORAL at 21:28

## 2018-06-13 RX ADMIN — TAMOXIFEN CITRATE 20 MILLIGRAM(S): 20 TABLET, FILM COATED ORAL at 09:59

## 2018-06-13 RX ADMIN — LITHIUM CARBONATE 300 MILLIGRAM(S): 300 TABLET, EXTENDED RELEASE ORAL at 09:21

## 2018-06-13 RX ADMIN — KETOCONAZOLE 1 APPLICATION(S): 20 AEROSOL, FOAM TOPICAL at 09:21

## 2018-06-13 RX ADMIN — Medication 5 MILLIGRAM(S): at 12:53

## 2018-06-13 RX ADMIN — Medication 5 MILLIGRAM(S): at 21:28

## 2018-06-13 RX ADMIN — Medication 1 TABLET(S): at 09:21

## 2018-06-13 RX ADMIN — DIVALPROEX SODIUM 500 MILLIGRAM(S): 500 TABLET, DELAYED RELEASE ORAL at 09:21

## 2018-06-13 RX ADMIN — Medication 1 TABLET(S): at 21:28

## 2018-06-13 NOTE — ED ADULT NURSE REASSESSMENT NOTE - NS ED NURSE REASSESS COMMENT FT1
CO for Si initiated by MD Greene @ [id number], documentation on paper, refer to CO sheet for further info. 
Pt requested a dinner tray and after eating 2-3 spoons of rice, pt observed gagging and holding her neck. Pt unable to speak. Heimlich maneuver completed and rapid response team activated. Pt observed to vomit small amount of rice grains. Pt continued with + stridor and labored breathing. Pt able to verbalize some words after vomiting. Pt transferred to main ed at this time for further medical workup.
pt brought to rm 12 from  s/p choking episode, presents in NAD, able to complete sentences, no resp distress noted, requesting food @ this time, VS as noted, MDs @ bedside, awaiting ENT for further eval, will continue to monitor. 
pt cleared by ENT- no food obstruction present, medicated as ordered, tolerated PO- MED cleared to go back to -  RN aware
Received pt from main ed from Alla PHILLIP, medically stable and cleared s/p choking incident. Pt arrives awake, calm at baseline mental status. NAD, VSS. Pt close to nursing station for ongoing observation. 1:1 obs dc'd by Hunter BURDICK at 0050 upon arrival to  area. Report given to Edwin PHILLIP on low 6. Awaiting ems for tx to [facility] at this time.

## 2018-06-13 NOTE — CONSULT NOTE ADULT - SUBJECTIVE AND OBJECTIVE BOX
58 year old woman with history of schizophrenia currently admitted to [facility] [place] who had a choking episode earlier tonight. She was eating green beans and rice and started choking. One of the staff members performed the heimlich maneuver and she coughed out some of the food she was eating. She now feels perfectly fine and denies foreign body sensation in the throat. No difficulty breathing or voice changes.    AVSS  NAD, calm  Breathing comfortably without stridor or retractions  EOMI  NC clear b/l  OC: tongue wnl, OP clear  Neck soft/flat    Fiberoptic exam: nasopharynx normal, oropharynx normal, larynx appears normal with normal vocal fold mobility, hypopharynx appears normal. No foreign body visualized.    A/P 58 year old woman with choking episode earlier tonight. No foreign body visualized on fiberoptic exam.  -Okay for dispo per ED  -Would recommend SLP evaluation since patient says this happens fairly frequently  -Available if any questions  -May follow-up as outpatient if any persistent issues

## 2018-06-14 RX ADMIN — LITHIUM CARBONATE 300 MILLIGRAM(S): 300 TABLET, EXTENDED RELEASE ORAL at 09:14

## 2018-06-14 RX ADMIN — Medication 5 MILLIGRAM(S): at 09:15

## 2018-06-14 RX ADMIN — PANTOPRAZOLE SODIUM 40 MILLIGRAM(S): 20 TABLET, DELAYED RELEASE ORAL at 08:00

## 2018-06-14 RX ADMIN — LITHIUM CARBONATE 300 MILLIGRAM(S): 300 TABLET, EXTENDED RELEASE ORAL at 21:40

## 2018-06-14 RX ADMIN — DIVALPROEX SODIUM 500 MILLIGRAM(S): 500 TABLET, DELAYED RELEASE ORAL at 13:12

## 2018-06-14 RX ADMIN — OLANZAPINE 30 MILLIGRAM(S): 15 TABLET, FILM COATED ORAL at 21:40

## 2018-06-14 RX ADMIN — DIVALPROEX SODIUM 500 MILLIGRAM(S): 500 TABLET, DELAYED RELEASE ORAL at 21:40

## 2018-06-14 RX ADMIN — DIVALPROEX SODIUM 500 MILLIGRAM(S): 500 TABLET, DELAYED RELEASE ORAL at 09:14

## 2018-06-14 RX ADMIN — Medication 1 TABLET(S): at 21:41

## 2018-06-14 RX ADMIN — Medication 1 TABLET(S): at 09:15

## 2018-06-14 RX ADMIN — KETOCONAZOLE 1 APPLICATION(S): 20 AEROSOL, FOAM TOPICAL at 09:14

## 2018-06-14 RX ADMIN — Medication 5 MILLIGRAM(S): at 13:12

## 2018-06-14 RX ADMIN — Medication 1000 UNIT(S): at 09:14

## 2018-06-14 RX ADMIN — TAMOXIFEN CITRATE 20 MILLIGRAM(S): 20 TABLET, FILM COATED ORAL at 09:15

## 2018-06-14 RX ADMIN — Medication 5 MILLIGRAM(S): at 21:40

## 2018-06-15 PROCEDURE — 99232 SBSQ HOSP IP/OBS MODERATE 35: CPT

## 2018-06-15 RX ADMIN — TAMOXIFEN CITRATE 20 MILLIGRAM(S): 20 TABLET, FILM COATED ORAL at 09:00

## 2018-06-15 RX ADMIN — Medication 5 MILLIGRAM(S): at 12:41

## 2018-06-15 RX ADMIN — PANTOPRAZOLE SODIUM 40 MILLIGRAM(S): 20 TABLET, DELAYED RELEASE ORAL at 09:00

## 2018-06-15 RX ADMIN — Medication 5 MILLIGRAM(S): at 20:45

## 2018-06-15 RX ADMIN — KETOCONAZOLE 1 APPLICATION(S): 20 AEROSOL, FOAM TOPICAL at 09:00

## 2018-06-15 RX ADMIN — LITHIUM CARBONATE 300 MILLIGRAM(S): 300 TABLET, EXTENDED RELEASE ORAL at 09:00

## 2018-06-15 RX ADMIN — DIVALPROEX SODIUM 500 MILLIGRAM(S): 500 TABLET, DELAYED RELEASE ORAL at 09:00

## 2018-06-15 RX ADMIN — DIVALPROEX SODIUM 500 MILLIGRAM(S): 500 TABLET, DELAYED RELEASE ORAL at 20:45

## 2018-06-15 RX ADMIN — Medication 1000 UNIT(S): at 09:00

## 2018-06-15 RX ADMIN — OLANZAPINE 30 MILLIGRAM(S): 15 TABLET, FILM COATED ORAL at 20:45

## 2018-06-15 RX ADMIN — Medication 1 TABLET(S): at 09:00

## 2018-06-15 RX ADMIN — DIVALPROEX SODIUM 500 MILLIGRAM(S): 500 TABLET, DELAYED RELEASE ORAL at 12:41

## 2018-06-15 RX ADMIN — Medication 1 TABLET(S): at 20:45

## 2018-06-15 RX ADMIN — Medication 5 MILLIGRAM(S): at 09:00

## 2018-06-15 RX ADMIN — LITHIUM CARBONATE 300 MILLIGRAM(S): 300 TABLET, EXTENDED RELEASE ORAL at 20:45

## 2018-06-16 PROCEDURE — 99232 SBSQ HOSP IP/OBS MODERATE 35: CPT

## 2018-06-16 RX ADMIN — DIVALPROEX SODIUM 500 MILLIGRAM(S): 500 TABLET, DELAYED RELEASE ORAL at 08:53

## 2018-06-16 RX ADMIN — DIVALPROEX SODIUM 500 MILLIGRAM(S): 500 TABLET, DELAYED RELEASE ORAL at 12:46

## 2018-06-16 RX ADMIN — DIVALPROEX SODIUM 500 MILLIGRAM(S): 500 TABLET, DELAYED RELEASE ORAL at 20:40

## 2018-06-16 RX ADMIN — OLANZAPINE 30 MILLIGRAM(S): 15 TABLET, FILM COATED ORAL at 20:41

## 2018-06-16 RX ADMIN — Medication 5 MILLIGRAM(S): at 20:41

## 2018-06-16 RX ADMIN — PANTOPRAZOLE SODIUM 40 MILLIGRAM(S): 20 TABLET, DELAYED RELEASE ORAL at 08:54

## 2018-06-16 RX ADMIN — LITHIUM CARBONATE 300 MILLIGRAM(S): 300 TABLET, EXTENDED RELEASE ORAL at 08:54

## 2018-06-16 RX ADMIN — Medication 5 MILLIGRAM(S): at 12:46

## 2018-06-16 RX ADMIN — LITHIUM CARBONATE 300 MILLIGRAM(S): 300 TABLET, EXTENDED RELEASE ORAL at 20:41

## 2018-06-16 RX ADMIN — Medication 5 MILLIGRAM(S): at 08:54

## 2018-06-16 RX ADMIN — TAMOXIFEN CITRATE 20 MILLIGRAM(S): 20 TABLET, FILM COATED ORAL at 08:54

## 2018-06-16 RX ADMIN — KETOCONAZOLE 1 APPLICATION(S): 20 AEROSOL, FOAM TOPICAL at 08:54

## 2018-06-16 RX ADMIN — Medication 1000 UNIT(S): at 08:53

## 2018-06-17 RX ADMIN — LITHIUM CARBONATE 300 MILLIGRAM(S): 300 TABLET, EXTENDED RELEASE ORAL at 21:04

## 2018-06-17 RX ADMIN — LITHIUM CARBONATE 300 MILLIGRAM(S): 300 TABLET, EXTENDED RELEASE ORAL at 09:25

## 2018-06-17 RX ADMIN — Medication 5 MILLIGRAM(S): at 09:25

## 2018-06-17 RX ADMIN — TAMOXIFEN CITRATE 20 MILLIGRAM(S): 20 TABLET, FILM COATED ORAL at 09:25

## 2018-06-17 RX ADMIN — DIVALPROEX SODIUM 500 MILLIGRAM(S): 500 TABLET, DELAYED RELEASE ORAL at 09:25

## 2018-06-17 RX ADMIN — KETOCONAZOLE 1 APPLICATION(S): 20 AEROSOL, FOAM TOPICAL at 09:25

## 2018-06-17 RX ADMIN — DIVALPROEX SODIUM 500 MILLIGRAM(S): 500 TABLET, DELAYED RELEASE ORAL at 13:34

## 2018-06-17 RX ADMIN — PANTOPRAZOLE SODIUM 40 MILLIGRAM(S): 20 TABLET, DELAYED RELEASE ORAL at 09:25

## 2018-06-17 RX ADMIN — DIVALPROEX SODIUM 500 MILLIGRAM(S): 500 TABLET, DELAYED RELEASE ORAL at 21:04

## 2018-06-17 RX ADMIN — OLANZAPINE 30 MILLIGRAM(S): 15 TABLET, FILM COATED ORAL at 21:05

## 2018-06-17 RX ADMIN — Medication 5 MILLIGRAM(S): at 21:05

## 2018-06-17 RX ADMIN — Medication 5 MILLIGRAM(S): at 13:34

## 2018-06-17 RX ADMIN — Medication 1000 UNIT(S): at 09:25

## 2018-06-18 PROCEDURE — 99232 SBSQ HOSP IP/OBS MODERATE 35: CPT

## 2018-06-18 RX ADMIN — DIVALPROEX SODIUM 500 MILLIGRAM(S): 500 TABLET, DELAYED RELEASE ORAL at 10:25

## 2018-06-18 RX ADMIN — Medication 5 MILLIGRAM(S): at 21:54

## 2018-06-18 RX ADMIN — DIVALPROEX SODIUM 500 MILLIGRAM(S): 500 TABLET, DELAYED RELEASE ORAL at 12:42

## 2018-06-18 RX ADMIN — DIVALPROEX SODIUM 500 MILLIGRAM(S): 500 TABLET, DELAYED RELEASE ORAL at 21:54

## 2018-06-18 RX ADMIN — Medication 5 MILLIGRAM(S): at 10:25

## 2018-06-18 RX ADMIN — KETOCONAZOLE 1 APPLICATION(S): 20 AEROSOL, FOAM TOPICAL at 10:25

## 2018-06-18 RX ADMIN — Medication 5 MILLIGRAM(S): at 12:42

## 2018-06-18 RX ADMIN — PANTOPRAZOLE SODIUM 40 MILLIGRAM(S): 20 TABLET, DELAYED RELEASE ORAL at 10:25

## 2018-06-18 RX ADMIN — LITHIUM CARBONATE 300 MILLIGRAM(S): 300 TABLET, EXTENDED RELEASE ORAL at 21:54

## 2018-06-18 RX ADMIN — LITHIUM CARBONATE 300 MILLIGRAM(S): 300 TABLET, EXTENDED RELEASE ORAL at 10:25

## 2018-06-18 RX ADMIN — TAMOXIFEN CITRATE 20 MILLIGRAM(S): 20 TABLET, FILM COATED ORAL at 10:25

## 2018-06-18 RX ADMIN — OLANZAPINE 30 MILLIGRAM(S): 15 TABLET, FILM COATED ORAL at 21:54

## 2018-06-18 RX ADMIN — Medication 1000 UNIT(S): at 10:25

## 2018-06-19 PROCEDURE — 99232 SBSQ HOSP IP/OBS MODERATE 35: CPT

## 2018-06-19 RX ORDER — FLUOXETINE HCL 10 MG
10 CAPSULE ORAL DAILY
Qty: 0 | Refills: 0 | Status: DISCONTINUED | OUTPATIENT
Start: 2018-06-19 | End: 2018-07-02

## 2018-06-19 RX ADMIN — TAMOXIFEN CITRATE 20 MILLIGRAM(S): 20 TABLET, FILM COATED ORAL at 10:12

## 2018-06-19 RX ADMIN — LITHIUM CARBONATE 300 MILLIGRAM(S): 300 TABLET, EXTENDED RELEASE ORAL at 21:38

## 2018-06-19 RX ADMIN — Medication 5 MILLIGRAM(S): at 10:12

## 2018-06-19 RX ADMIN — Medication 10 MILLIGRAM(S): at 17:45

## 2018-06-19 RX ADMIN — OLANZAPINE 30 MILLIGRAM(S): 15 TABLET, FILM COATED ORAL at 21:38

## 2018-06-19 RX ADMIN — Medication 5 MILLIGRAM(S): at 21:38

## 2018-06-19 RX ADMIN — Medication 1000 UNIT(S): at 10:11

## 2018-06-19 RX ADMIN — KETOCONAZOLE 1 APPLICATION(S): 20 AEROSOL, FOAM TOPICAL at 13:20

## 2018-06-19 RX ADMIN — PANTOPRAZOLE SODIUM 40 MILLIGRAM(S): 20 TABLET, DELAYED RELEASE ORAL at 10:12

## 2018-06-19 RX ADMIN — DIVALPROEX SODIUM 500 MILLIGRAM(S): 500 TABLET, DELAYED RELEASE ORAL at 13:20

## 2018-06-19 RX ADMIN — LITHIUM CARBONATE 300 MILLIGRAM(S): 300 TABLET, EXTENDED RELEASE ORAL at 10:12

## 2018-06-19 RX ADMIN — DIVALPROEX SODIUM 500 MILLIGRAM(S): 500 TABLET, DELAYED RELEASE ORAL at 10:11

## 2018-06-19 RX ADMIN — DIVALPROEX SODIUM 500 MILLIGRAM(S): 500 TABLET, DELAYED RELEASE ORAL at 21:38

## 2018-06-19 RX ADMIN — Medication 5 MILLIGRAM(S): at 13:20

## 2018-06-20 LAB
CHOLEST SERPL-MCNC: 117 MG/DL — LOW (ref 120–199)
HDLC SERPL-MCNC: 46 MG/DL — SIGNIFICANT CHANGE UP (ref 45–65)
LIPID PNL WITH DIRECT LDL SERPL: 47 MG/DL — SIGNIFICANT CHANGE UP
LITHIUM SERPL-MCNC: 0.61 MMOL/L — SIGNIFICANT CHANGE UP (ref 0.6–1.2)
TRIGL SERPL-MCNC: 151 MG/DL — HIGH (ref 10–149)
VALPROATE SERPL-MCNC: 52.6 UG/ML — SIGNIFICANT CHANGE UP (ref 50–100)

## 2018-06-20 PROCEDURE — 99232 SBSQ HOSP IP/OBS MODERATE 35: CPT

## 2018-06-20 RX ORDER — ACETAMINOPHEN 500 MG
650 TABLET ORAL ONCE
Qty: 0 | Refills: 0 | Status: COMPLETED | OUTPATIENT
Start: 2018-06-20 | End: 2018-06-20

## 2018-06-20 RX ADMIN — DIVALPROEX SODIUM 500 MILLIGRAM(S): 500 TABLET, DELAYED RELEASE ORAL at 13:55

## 2018-06-20 RX ADMIN — DIVALPROEX SODIUM 500 MILLIGRAM(S): 500 TABLET, DELAYED RELEASE ORAL at 10:11

## 2018-06-20 RX ADMIN — LITHIUM CARBONATE 300 MILLIGRAM(S): 300 TABLET, EXTENDED RELEASE ORAL at 21:21

## 2018-06-20 RX ADMIN — Medication 10 MILLIGRAM(S): at 10:10

## 2018-06-20 RX ADMIN — PANTOPRAZOLE SODIUM 40 MILLIGRAM(S): 20 TABLET, DELAYED RELEASE ORAL at 10:11

## 2018-06-20 RX ADMIN — OLANZAPINE 30 MILLIGRAM(S): 15 TABLET, FILM COATED ORAL at 21:21

## 2018-06-20 RX ADMIN — Medication 5 MILLIGRAM(S): at 21:21

## 2018-06-20 RX ADMIN — TAMOXIFEN CITRATE 20 MILLIGRAM(S): 20 TABLET, FILM COATED ORAL at 10:11

## 2018-06-20 RX ADMIN — Medication 5 MILLIGRAM(S): at 10:11

## 2018-06-20 RX ADMIN — Medication 5 MILLIGRAM(S): at 13:55

## 2018-06-20 RX ADMIN — DIVALPROEX SODIUM 500 MILLIGRAM(S): 500 TABLET, DELAYED RELEASE ORAL at 21:21

## 2018-06-20 RX ADMIN — Medication 1000 UNIT(S): at 10:11

## 2018-06-20 RX ADMIN — KETOCONAZOLE 1 APPLICATION(S): 20 AEROSOL, FOAM TOPICAL at 10:10

## 2018-06-20 RX ADMIN — Medication 650 MILLIGRAM(S): at 21:21

## 2018-06-20 RX ADMIN — LITHIUM CARBONATE 300 MILLIGRAM(S): 300 TABLET, EXTENDED RELEASE ORAL at 10:10

## 2018-06-21 PROCEDURE — 99232 SBSQ HOSP IP/OBS MODERATE 35: CPT

## 2018-06-21 RX ORDER — SENNA PLUS 8.6 MG/1
2 TABLET ORAL ONCE
Qty: 0 | Refills: 0 | Status: COMPLETED | OUTPATIENT
Start: 2018-06-21 | End: 2018-06-21

## 2018-06-21 RX ORDER — DOCUSATE SODIUM 100 MG
100 CAPSULE ORAL
Qty: 0 | Refills: 0 | Status: DISCONTINUED | OUTPATIENT
Start: 2018-06-21 | End: 2018-07-02

## 2018-06-21 RX ADMIN — DIVALPROEX SODIUM 500 MILLIGRAM(S): 500 TABLET, DELAYED RELEASE ORAL at 21:43

## 2018-06-21 RX ADMIN — SENNA PLUS 2 TABLET(S): 8.6 TABLET ORAL at 13:01

## 2018-06-21 RX ADMIN — Medication 100 MILLIGRAM(S): at 21:44

## 2018-06-21 RX ADMIN — Medication 5 MILLIGRAM(S): at 09:01

## 2018-06-21 RX ADMIN — LITHIUM CARBONATE 300 MILLIGRAM(S): 300 TABLET, EXTENDED RELEASE ORAL at 21:44

## 2018-06-21 RX ADMIN — DIVALPROEX SODIUM 500 MILLIGRAM(S): 500 TABLET, DELAYED RELEASE ORAL at 09:00

## 2018-06-21 RX ADMIN — LITHIUM CARBONATE 300 MILLIGRAM(S): 300 TABLET, EXTENDED RELEASE ORAL at 09:01

## 2018-06-21 RX ADMIN — PANTOPRAZOLE SODIUM 40 MILLIGRAM(S): 20 TABLET, DELAYED RELEASE ORAL at 09:02

## 2018-06-21 RX ADMIN — KETOCONAZOLE 1 APPLICATION(S): 20 AEROSOL, FOAM TOPICAL at 09:01

## 2018-06-21 RX ADMIN — OLANZAPINE 30 MILLIGRAM(S): 15 TABLET, FILM COATED ORAL at 21:44

## 2018-06-21 RX ADMIN — Medication 10 MILLIGRAM(S): at 09:01

## 2018-06-21 RX ADMIN — Medication 5 MILLIGRAM(S): at 21:44

## 2018-06-21 RX ADMIN — TAMOXIFEN CITRATE 20 MILLIGRAM(S): 20 TABLET, FILM COATED ORAL at 09:02

## 2018-06-21 RX ADMIN — Medication 1000 UNIT(S): at 09:00

## 2018-06-21 RX ADMIN — Medication 5 MILLIGRAM(S): at 13:04

## 2018-06-21 RX ADMIN — DIVALPROEX SODIUM 500 MILLIGRAM(S): 500 TABLET, DELAYED RELEASE ORAL at 13:04

## 2018-06-22 PROCEDURE — 99232 SBSQ HOSP IP/OBS MODERATE 35: CPT

## 2018-06-22 RX ADMIN — OLANZAPINE 30 MILLIGRAM(S): 15 TABLET, FILM COATED ORAL at 21:28

## 2018-06-22 RX ADMIN — DIVALPROEX SODIUM 500 MILLIGRAM(S): 500 TABLET, DELAYED RELEASE ORAL at 09:58

## 2018-06-22 RX ADMIN — Medication 1000 UNIT(S): at 09:58

## 2018-06-22 RX ADMIN — LITHIUM CARBONATE 300 MILLIGRAM(S): 300 TABLET, EXTENDED RELEASE ORAL at 09:58

## 2018-06-22 RX ADMIN — LITHIUM CARBONATE 300 MILLIGRAM(S): 300 TABLET, EXTENDED RELEASE ORAL at 21:28

## 2018-06-22 RX ADMIN — Medication 5 MILLIGRAM(S): at 09:59

## 2018-06-22 RX ADMIN — PANTOPRAZOLE SODIUM 40 MILLIGRAM(S): 20 TABLET, DELAYED RELEASE ORAL at 09:59

## 2018-06-22 RX ADMIN — Medication 100 MILLIGRAM(S): at 09:58

## 2018-06-22 RX ADMIN — Medication 5 MILLIGRAM(S): at 13:20

## 2018-06-22 RX ADMIN — Medication 10 MILLIGRAM(S): at 09:58

## 2018-06-22 RX ADMIN — Medication 100 MILLIGRAM(S): at 21:28

## 2018-06-22 RX ADMIN — DIVALPROEX SODIUM 500 MILLIGRAM(S): 500 TABLET, DELAYED RELEASE ORAL at 21:28

## 2018-06-22 RX ADMIN — DIVALPROEX SODIUM 500 MILLIGRAM(S): 500 TABLET, DELAYED RELEASE ORAL at 13:20

## 2018-06-22 RX ADMIN — Medication 5 MILLIGRAM(S): at 21:28

## 2018-06-22 RX ADMIN — TAMOXIFEN CITRATE 20 MILLIGRAM(S): 20 TABLET, FILM COATED ORAL at 09:59

## 2018-06-23 RX ADMIN — Medication 5 MILLIGRAM(S): at 13:57

## 2018-06-23 RX ADMIN — TAMOXIFEN CITRATE 20 MILLIGRAM(S): 20 TABLET, FILM COATED ORAL at 08:41

## 2018-06-23 RX ADMIN — Medication 5 MILLIGRAM(S): at 21:15

## 2018-06-23 RX ADMIN — LITHIUM CARBONATE 300 MILLIGRAM(S): 300 TABLET, EXTENDED RELEASE ORAL at 21:15

## 2018-06-23 RX ADMIN — DIVALPROEX SODIUM 500 MILLIGRAM(S): 500 TABLET, DELAYED RELEASE ORAL at 08:41

## 2018-06-23 RX ADMIN — Medication 100 MILLIGRAM(S): at 08:41

## 2018-06-23 RX ADMIN — Medication 10 MILLIGRAM(S): at 08:41

## 2018-06-23 RX ADMIN — DIVALPROEX SODIUM 500 MILLIGRAM(S): 500 TABLET, DELAYED RELEASE ORAL at 14:07

## 2018-06-23 RX ADMIN — PANTOPRAZOLE SODIUM 40 MILLIGRAM(S): 20 TABLET, DELAYED RELEASE ORAL at 08:41

## 2018-06-23 RX ADMIN — OLANZAPINE 30 MILLIGRAM(S): 15 TABLET, FILM COATED ORAL at 21:15

## 2018-06-23 RX ADMIN — LITHIUM CARBONATE 300 MILLIGRAM(S): 300 TABLET, EXTENDED RELEASE ORAL at 08:41

## 2018-06-23 RX ADMIN — DIVALPROEX SODIUM 500 MILLIGRAM(S): 500 TABLET, DELAYED RELEASE ORAL at 21:15

## 2018-06-23 RX ADMIN — Medication 5 MILLIGRAM(S): at 08:41

## 2018-06-23 RX ADMIN — KETOCONAZOLE 1 APPLICATION(S): 20 AEROSOL, FOAM TOPICAL at 08:41

## 2018-06-23 RX ADMIN — Medication 100 MILLIGRAM(S): at 21:15

## 2018-06-23 RX ADMIN — Medication 1000 UNIT(S): at 08:41

## 2018-06-23 NOTE — CONSULT NOTE ADULT - CONSULT REASON
Rule out foreign body
Medical Rapid Response called for this 58 year old female with psych disorder now with exacerbation found choking by staff during breakfast.

## 2018-06-23 NOTE — CONSULT NOTE ADULT - ASSESSMENT
58 YEAR OLD FEMALE WITH PSYCH DISORDER CHOKED ON FOOD DURING BREAKFAST, S/P SUCCESSFUL HEIMLICH PERFORMED BY STAFF.  PT IN NO DISTRESS.  ESSENTIALLY NORMAL PHYSICAL.  OXYGEN SAT 98%.  OBSERVE FOR COUGHING AND POSSIBLE ASPIRATION PNEUMONIA.  SOFT MECHANICAL DIET.

## 2018-06-23 NOTE — CONSULT NOTE ADULT - SUBJECTIVE AND OBJECTIVE BOX
HPI:   58 YEAR OLD FEMALE WITH PSYCH DISORDER FOUND CHOKING ON FOOD DURING BREAKFAST.  STAFF PERFORMED SUCCESSFUL HEIMLICH PROCEDURE.  CHICKEN NUGGET EXPELLED.  PT ABLE TO SPEAK AND DISTRESS RESOLVED.  PT STATES CHOKED ONCE IN PAST.  PT ON MECHANICAL SOFT DIET.    PAST MEDICAL & SURGICAL HISTORY:  Vitamin D deficiency  GERD (gastroesophageal reflux disease)  Breast cancer  MR (mental retardation)  Seizure  No significant past surgical history    Allergies    penicillin (Unknown)  Seafood (Unknown)      MEDICATIONS  (STANDING):  cholecalciferol 1000 Unit(s) Oral daily  diVALproex  milliGRAM(s) Oral three times a day  docusate sodium 100 milliGRAM(s) Oral two times a day  FLUoxetine 10 milliGRAM(s) Oral daily  ketoconazole 2% Cream 1 Application(s) Topical daily  lithium 300 milliGRAM(s) Oral two times a day  OLANZapine 30 milliGRAM(s) Oral at bedtime  oxybutynin 5 milliGRAM(s) Oral three times a day  pantoprazole    Tablet 40 milliGRAM(s) Oral before breakfast  tamoxifen 20 milliGRAM(s) Oral daily    MEDICATIONS  (PRN):  sodium chloride 0.65% Nasal 1 Spray(s) Both Nostrils two times a day PRN Nasal Congestion        PHYSICAL EXAM:  GENERAL: NAD, well-developed  HEAD:  Atraumatic, Normocephalic  EYES: EOM, conjunctiva and sclera clear  NECK: Supple, No JVD  CHEST/LUNG: Clear to auscultation bilaterally; OCCASIONAL SCATTERED WHEEZES  HEART: Regular rate and rhythm; No murmurs, rubs, or gallops  ABDOMEN: Soft, Nontender, Nondistended; Bowel sounds present  EXTREMITIES:   No clubbing, cyanosis, or edema  NEUROLOGY: non-focal  SKIN: No rashes or lesions      Care Discussed with Consultants/Other Providers:  STAFF HPI:   58 YEAR OLD FEMALE WITH PSYCH DISORDER FOUND CHOKING ON FOOD DURING BREAKFAST.  STAFF PERFORMED SUCCESSFUL HEIMLICH PROCEDURE.  CHICKEN NUGGET EXPELLED.  PT ABLE TO SPEAK AND DISTRESS RESOLVED.  PT STATES CHOKED ONCE IN PAST.  PT ON MECHANICAL SOFT DIET.    PAST MEDICAL & SURGICAL HISTORY:  Vitamin D deficiency  GERD (gastroesophageal reflux disease)  Breast cancer  MR (mental retardation)  Seizure  No significant past surgical history    Allergies    penicillin (Unknown)  Seafood (Unknown)      MEDICATIONS  (STANDING):  cholecalciferol 1000 Unit(s) Oral daily  diVALproex  milliGRAM(s) Oral three times a day  docusate sodium 100 milliGRAM(s) Oral two times a day  FLUoxetine 10 milliGRAM(s) Oral daily  ketoconazole 2% Cream 1 Application(s) Topical daily  lithium 300 milliGRAM(s) Oral two times a day  OLANZapine 30 milliGRAM(s) Oral at bedtime  oxybutynin 5 milliGRAM(s) Oral three times a day  pantoprazole    Tablet 40 milliGRAM(s) Oral before breakfast  tamoxifen 20 milliGRAM(s) Oral daily    MEDICATIONS  (PRN):  sodium chloride 0.65% Nasal 1 Spray(s) Both Nostrils two times a day PRN Nasal Congestion    VS:  T 97.8  104/61 60    PHYSICAL EXAM:  GENERAL: NAD, well-developed  HEAD:  Atraumatic, Normocephalic  EYES: EOM, conjunctiva and sclera clear  NECK: Supple, No JVD  CHEST/LUNG: Clear to auscultation bilaterally; OCCASIONAL SCATTERED WHEEZES  HEART: Regular rate and rhythm; No murmurs, rubs, or gallops  ABDOMEN: Soft, Nontender, Nondistended; Bowel sounds present  EXTREMITIES:   No clubbing, cyanosis, or edema  NEUROLOGY: non-focal  SKIN: No rashes or lesions      Care Discussed with Consultants/Other Providers:  STAFF

## 2018-06-24 RX ADMIN — LITHIUM CARBONATE 300 MILLIGRAM(S): 300 TABLET, EXTENDED RELEASE ORAL at 09:43

## 2018-06-24 RX ADMIN — Medication 100 MILLIGRAM(S): at 21:00

## 2018-06-24 RX ADMIN — PANTOPRAZOLE SODIUM 40 MILLIGRAM(S): 20 TABLET, DELAYED RELEASE ORAL at 09:43

## 2018-06-24 RX ADMIN — Medication 1000 UNIT(S): at 09:44

## 2018-06-24 RX ADMIN — DIVALPROEX SODIUM 500 MILLIGRAM(S): 500 TABLET, DELAYED RELEASE ORAL at 21:00

## 2018-06-24 RX ADMIN — OLANZAPINE 30 MILLIGRAM(S): 15 TABLET, FILM COATED ORAL at 21:00

## 2018-06-24 RX ADMIN — Medication 5 MILLIGRAM(S): at 09:43

## 2018-06-24 RX ADMIN — Medication 100 MILLIGRAM(S): at 09:43

## 2018-06-24 RX ADMIN — Medication 10 MILLIGRAM(S): at 09:43

## 2018-06-24 RX ADMIN — DIVALPROEX SODIUM 500 MILLIGRAM(S): 500 TABLET, DELAYED RELEASE ORAL at 13:16

## 2018-06-24 RX ADMIN — TAMOXIFEN CITRATE 20 MILLIGRAM(S): 20 TABLET, FILM COATED ORAL at 09:43

## 2018-06-24 RX ADMIN — Medication 5 MILLIGRAM(S): at 13:15

## 2018-06-24 RX ADMIN — Medication 5 MILLIGRAM(S): at 21:00

## 2018-06-24 RX ADMIN — LITHIUM CARBONATE 300 MILLIGRAM(S): 300 TABLET, EXTENDED RELEASE ORAL at 21:00

## 2018-06-24 RX ADMIN — DIVALPROEX SODIUM 500 MILLIGRAM(S): 500 TABLET, DELAYED RELEASE ORAL at 09:44

## 2018-06-25 PROCEDURE — 99232 SBSQ HOSP IP/OBS MODERATE 35: CPT

## 2018-06-25 RX ADMIN — Medication 100 MILLIGRAM(S): at 08:53

## 2018-06-25 RX ADMIN — Medication 1000 UNIT(S): at 08:53

## 2018-06-25 RX ADMIN — Medication 5 MILLIGRAM(S): at 13:39

## 2018-06-25 RX ADMIN — PANTOPRAZOLE SODIUM 40 MILLIGRAM(S): 20 TABLET, DELAYED RELEASE ORAL at 08:53

## 2018-06-25 RX ADMIN — OLANZAPINE 30 MILLIGRAM(S): 15 TABLET, FILM COATED ORAL at 21:17

## 2018-06-25 RX ADMIN — Medication 5 MILLIGRAM(S): at 08:53

## 2018-06-25 RX ADMIN — KETOCONAZOLE 1 APPLICATION(S): 20 AEROSOL, FOAM TOPICAL at 08:53

## 2018-06-25 RX ADMIN — LITHIUM CARBONATE 300 MILLIGRAM(S): 300 TABLET, EXTENDED RELEASE ORAL at 21:17

## 2018-06-25 RX ADMIN — Medication 10 MILLIGRAM(S): at 08:53

## 2018-06-25 RX ADMIN — DIVALPROEX SODIUM 500 MILLIGRAM(S): 500 TABLET, DELAYED RELEASE ORAL at 13:39

## 2018-06-25 RX ADMIN — DIVALPROEX SODIUM 500 MILLIGRAM(S): 500 TABLET, DELAYED RELEASE ORAL at 21:17

## 2018-06-25 RX ADMIN — DIVALPROEX SODIUM 500 MILLIGRAM(S): 500 TABLET, DELAYED RELEASE ORAL at 08:53

## 2018-06-25 RX ADMIN — Medication 5 MILLIGRAM(S): at 21:17

## 2018-06-25 RX ADMIN — TAMOXIFEN CITRATE 20 MILLIGRAM(S): 20 TABLET, FILM COATED ORAL at 08:53

## 2018-06-25 RX ADMIN — LITHIUM CARBONATE 300 MILLIGRAM(S): 300 TABLET, EXTENDED RELEASE ORAL at 08:53

## 2018-06-26 PROCEDURE — 99232 SBSQ HOSP IP/OBS MODERATE 35: CPT

## 2018-06-26 RX ADMIN — KETOCONAZOLE 1 APPLICATION(S): 20 AEROSOL, FOAM TOPICAL at 10:00

## 2018-06-26 RX ADMIN — DIVALPROEX SODIUM 500 MILLIGRAM(S): 500 TABLET, DELAYED RELEASE ORAL at 10:00

## 2018-06-26 RX ADMIN — OLANZAPINE 30 MILLIGRAM(S): 15 TABLET, FILM COATED ORAL at 20:49

## 2018-06-26 RX ADMIN — LITHIUM CARBONATE 300 MILLIGRAM(S): 300 TABLET, EXTENDED RELEASE ORAL at 10:00

## 2018-06-26 RX ADMIN — Medication 100 MILLIGRAM(S): at 10:00

## 2018-06-26 RX ADMIN — Medication 5 MILLIGRAM(S): at 13:09

## 2018-06-26 RX ADMIN — PANTOPRAZOLE SODIUM 40 MILLIGRAM(S): 20 TABLET, DELAYED RELEASE ORAL at 10:42

## 2018-06-26 RX ADMIN — TAMOXIFEN CITRATE 20 MILLIGRAM(S): 20 TABLET, FILM COATED ORAL at 10:42

## 2018-06-26 RX ADMIN — Medication 10 MILLIGRAM(S): at 10:00

## 2018-06-26 RX ADMIN — LITHIUM CARBONATE 300 MILLIGRAM(S): 300 TABLET, EXTENDED RELEASE ORAL at 20:49

## 2018-06-26 RX ADMIN — DIVALPROEX SODIUM 500 MILLIGRAM(S): 500 TABLET, DELAYED RELEASE ORAL at 13:09

## 2018-06-26 RX ADMIN — Medication 1000 UNIT(S): at 10:00

## 2018-06-26 RX ADMIN — Medication 5 MILLIGRAM(S): at 10:42

## 2018-06-26 RX ADMIN — DIVALPROEX SODIUM 500 MILLIGRAM(S): 500 TABLET, DELAYED RELEASE ORAL at 20:49

## 2018-06-26 RX ADMIN — Medication 5 MILLIGRAM(S): at 20:49

## 2018-06-27 PROCEDURE — 99232 SBSQ HOSP IP/OBS MODERATE 35: CPT

## 2018-06-27 RX ADMIN — OLANZAPINE 30 MILLIGRAM(S): 15 TABLET, FILM COATED ORAL at 20:40

## 2018-06-27 RX ADMIN — Medication 5 MILLIGRAM(S): at 20:40

## 2018-06-27 RX ADMIN — DIVALPROEX SODIUM 500 MILLIGRAM(S): 500 TABLET, DELAYED RELEASE ORAL at 20:40

## 2018-06-27 RX ADMIN — LITHIUM CARBONATE 300 MILLIGRAM(S): 300 TABLET, EXTENDED RELEASE ORAL at 20:40

## 2018-06-28 PROCEDURE — 99232 SBSQ HOSP IP/OBS MODERATE 35: CPT

## 2018-06-28 RX ADMIN — DIVALPROEX SODIUM 500 MILLIGRAM(S): 500 TABLET, DELAYED RELEASE ORAL at 13:19

## 2018-06-28 RX ADMIN — DIVALPROEX SODIUM 500 MILLIGRAM(S): 500 TABLET, DELAYED RELEASE ORAL at 21:21

## 2018-06-28 RX ADMIN — Medication 100 MILLIGRAM(S): at 08:38

## 2018-06-28 RX ADMIN — Medication 1000 UNIT(S): at 08:38

## 2018-06-28 RX ADMIN — PANTOPRAZOLE SODIUM 40 MILLIGRAM(S): 20 TABLET, DELAYED RELEASE ORAL at 08:38

## 2018-06-28 RX ADMIN — KETOCONAZOLE 1 APPLICATION(S): 20 AEROSOL, FOAM TOPICAL at 08:38

## 2018-06-28 RX ADMIN — TAMOXIFEN CITRATE 20 MILLIGRAM(S): 20 TABLET, FILM COATED ORAL at 08:38

## 2018-06-28 RX ADMIN — OLANZAPINE 30 MILLIGRAM(S): 15 TABLET, FILM COATED ORAL at 21:22

## 2018-06-28 RX ADMIN — Medication 5 MILLIGRAM(S): at 13:19

## 2018-06-28 RX ADMIN — Medication 5 MILLIGRAM(S): at 21:22

## 2018-06-28 RX ADMIN — LITHIUM CARBONATE 300 MILLIGRAM(S): 300 TABLET, EXTENDED RELEASE ORAL at 08:38

## 2018-06-28 RX ADMIN — DIVALPROEX SODIUM 500 MILLIGRAM(S): 500 TABLET, DELAYED RELEASE ORAL at 08:38

## 2018-06-28 RX ADMIN — Medication 5 MILLIGRAM(S): at 08:38

## 2018-06-28 RX ADMIN — Medication 10 MILLIGRAM(S): at 08:38

## 2018-06-29 PROCEDURE — 99232 SBSQ HOSP IP/OBS MODERATE 35: CPT

## 2018-06-29 RX ADMIN — Medication 1000 UNIT(S): at 09:14

## 2018-06-29 RX ADMIN — Medication 5 MILLIGRAM(S): at 20:57

## 2018-06-29 RX ADMIN — Medication 10 MILLIGRAM(S): at 09:14

## 2018-06-29 RX ADMIN — KETOCONAZOLE 1 APPLICATION(S): 20 AEROSOL, FOAM TOPICAL at 09:14

## 2018-06-29 RX ADMIN — DIVALPROEX SODIUM 500 MILLIGRAM(S): 500 TABLET, DELAYED RELEASE ORAL at 20:58

## 2018-06-29 RX ADMIN — OLANZAPINE 30 MILLIGRAM(S): 15 TABLET, FILM COATED ORAL at 20:57

## 2018-06-29 RX ADMIN — Medication 5 MILLIGRAM(S): at 09:14

## 2018-06-29 RX ADMIN — TAMOXIFEN CITRATE 20 MILLIGRAM(S): 20 TABLET, FILM COATED ORAL at 09:14

## 2018-06-29 RX ADMIN — DIVALPROEX SODIUM 500 MILLIGRAM(S): 500 TABLET, DELAYED RELEASE ORAL at 09:14

## 2018-06-29 RX ADMIN — LITHIUM CARBONATE 300 MILLIGRAM(S): 300 TABLET, EXTENDED RELEASE ORAL at 09:14

## 2018-06-29 RX ADMIN — Medication 5 MILLIGRAM(S): at 12:54

## 2018-06-29 RX ADMIN — PANTOPRAZOLE SODIUM 40 MILLIGRAM(S): 20 TABLET, DELAYED RELEASE ORAL at 09:14

## 2018-06-29 RX ADMIN — Medication 100 MILLIGRAM(S): at 20:58

## 2018-06-29 RX ADMIN — DIVALPROEX SODIUM 500 MILLIGRAM(S): 500 TABLET, DELAYED RELEASE ORAL at 12:54

## 2018-06-29 RX ADMIN — LITHIUM CARBONATE 300 MILLIGRAM(S): 300 TABLET, EXTENDED RELEASE ORAL at 20:58

## 2018-06-29 RX ADMIN — Medication 100 MILLIGRAM(S): at 09:14

## 2018-06-30 RX ADMIN — Medication 5 MILLIGRAM(S): at 12:11

## 2018-06-30 RX ADMIN — DIVALPROEX SODIUM 500 MILLIGRAM(S): 500 TABLET, DELAYED RELEASE ORAL at 09:36

## 2018-06-30 RX ADMIN — LITHIUM CARBONATE 300 MILLIGRAM(S): 300 TABLET, EXTENDED RELEASE ORAL at 09:36

## 2018-06-30 RX ADMIN — PANTOPRAZOLE SODIUM 40 MILLIGRAM(S): 20 TABLET, DELAYED RELEASE ORAL at 09:36

## 2018-06-30 RX ADMIN — DIVALPROEX SODIUM 500 MILLIGRAM(S): 500 TABLET, DELAYED RELEASE ORAL at 12:11

## 2018-06-30 RX ADMIN — DIVALPROEX SODIUM 500 MILLIGRAM(S): 500 TABLET, DELAYED RELEASE ORAL at 20:54

## 2018-06-30 RX ADMIN — Medication 5 MILLIGRAM(S): at 20:54

## 2018-06-30 RX ADMIN — TAMOXIFEN CITRATE 20 MILLIGRAM(S): 20 TABLET, FILM COATED ORAL at 09:36

## 2018-06-30 RX ADMIN — Medication 1000 UNIT(S): at 09:36

## 2018-06-30 RX ADMIN — LITHIUM CARBONATE 300 MILLIGRAM(S): 300 TABLET, EXTENDED RELEASE ORAL at 20:54

## 2018-06-30 RX ADMIN — OLANZAPINE 30 MILLIGRAM(S): 15 TABLET, FILM COATED ORAL at 20:54

## 2018-06-30 RX ADMIN — Medication 10 MILLIGRAM(S): at 09:36

## 2018-06-30 RX ADMIN — Medication 5 MILLIGRAM(S): at 09:36

## 2018-07-01 VITALS — TEMPERATURE: 98 F

## 2018-07-01 RX ADMIN — Medication 5 MILLIGRAM(S): at 11:57

## 2018-07-01 RX ADMIN — LITHIUM CARBONATE 300 MILLIGRAM(S): 300 TABLET, EXTENDED RELEASE ORAL at 21:37

## 2018-07-01 RX ADMIN — Medication 5 MILLIGRAM(S): at 13:09

## 2018-07-01 RX ADMIN — Medication 10 MILLIGRAM(S): at 11:56

## 2018-07-01 RX ADMIN — DIVALPROEX SODIUM 500 MILLIGRAM(S): 500 TABLET, DELAYED RELEASE ORAL at 21:36

## 2018-07-01 RX ADMIN — Medication 5 MILLIGRAM(S): at 21:37

## 2018-07-01 RX ADMIN — DIVALPROEX SODIUM 500 MILLIGRAM(S): 500 TABLET, DELAYED RELEASE ORAL at 13:08

## 2018-07-01 RX ADMIN — LITHIUM CARBONATE 300 MILLIGRAM(S): 300 TABLET, EXTENDED RELEASE ORAL at 11:57

## 2018-07-01 RX ADMIN — OLANZAPINE 30 MILLIGRAM(S): 15 TABLET, FILM COATED ORAL at 21:37

## 2018-07-01 RX ADMIN — Medication 1000 UNIT(S): at 11:56

## 2018-07-01 RX ADMIN — DIVALPROEX SODIUM 500 MILLIGRAM(S): 500 TABLET, DELAYED RELEASE ORAL at 11:56

## 2018-07-01 RX ADMIN — TAMOXIFEN CITRATE 20 MILLIGRAM(S): 20 TABLET, FILM COATED ORAL at 11:57

## 2018-07-02 PROCEDURE — 99238 HOSP IP/OBS DSCHRG MGMT 30/<: CPT

## 2018-07-02 RX ORDER — CHOLECALCIFEROL (VITAMIN D3) 125 MCG
1000 CAPSULE ORAL
Qty: 30 | Refills: 0
Start: 2018-07-02 | End: 2018-07-31

## 2018-07-02 RX ORDER — TAMOXIFEN CITRATE 20 MG/1
1 TABLET, FILM COATED ORAL
Qty: 30 | Refills: 0
Start: 2018-07-02 | End: 2018-07-31

## 2018-07-02 RX ORDER — PANTOPRAZOLE SODIUM 20 MG/1
1 TABLET, DELAYED RELEASE ORAL
Qty: 30 | Refills: 0
Start: 2018-07-02 | End: 2018-07-31

## 2018-07-02 RX ORDER — FLUOXETINE HCL 10 MG
1 CAPSULE ORAL
Qty: 30 | Refills: 0
Start: 2018-07-02 | End: 2018-07-31

## 2018-07-02 RX ORDER — DOCUSATE SODIUM 100 MG
1 CAPSULE ORAL
Qty: 60 | Refills: 0
Start: 2018-07-02 | End: 2018-07-31

## 2018-07-02 RX ORDER — DIVALPROEX SODIUM 500 MG/1
1 TABLET, DELAYED RELEASE ORAL
Qty: 90 | Refills: 0
Start: 2018-07-02 | End: 2018-07-31

## 2018-07-02 RX ORDER — OXYBUTYNIN CHLORIDE 5 MG
1 TABLET ORAL
Qty: 90 | Refills: 0
Start: 2018-07-02 | End: 2018-07-31

## 2018-07-02 RX ORDER — OLANZAPINE 15 MG/1
2 TABLET, FILM COATED ORAL
Qty: 60 | Refills: 0
Start: 2018-07-02 | End: 2018-07-31

## 2018-07-02 RX ORDER — LITHIUM CARBONATE 300 MG/1
1 TABLET, EXTENDED RELEASE ORAL
Qty: 0 | Refills: 0 | COMMUNITY

## 2018-07-02 RX ORDER — LITHIUM CARBONATE 300 MG/1
1 TABLET, EXTENDED RELEASE ORAL
Qty: 60 | Refills: 0
Start: 2018-07-02 | End: 2018-07-31

## 2018-07-02 RX ADMIN — LITHIUM CARBONATE 300 MILLIGRAM(S): 300 TABLET, EXTENDED RELEASE ORAL at 11:23

## 2018-07-02 RX ADMIN — PANTOPRAZOLE SODIUM 40 MILLIGRAM(S): 20 TABLET, DELAYED RELEASE ORAL at 11:23

## 2018-07-02 RX ADMIN — Medication 5 MILLIGRAM(S): at 11:23

## 2018-07-02 RX ADMIN — Medication 5 MILLIGRAM(S): at 12:59

## 2018-07-02 RX ADMIN — TAMOXIFEN CITRATE 20 MILLIGRAM(S): 20 TABLET, FILM COATED ORAL at 11:23

## 2018-07-02 RX ADMIN — DIVALPROEX SODIUM 500 MILLIGRAM(S): 500 TABLET, DELAYED RELEASE ORAL at 11:23

## 2018-07-02 RX ADMIN — Medication 10 MILLIGRAM(S): at 11:23

## 2018-07-02 RX ADMIN — Medication 1000 UNIT(S): at 11:23

## 2018-07-02 RX ADMIN — Medication 100 MILLIGRAM(S): at 11:23

## 2018-07-02 RX ADMIN — DIVALPROEX SODIUM 500 MILLIGRAM(S): 500 TABLET, DELAYED RELEASE ORAL at 12:59

## 2019-05-01 PROBLEM — K21.9 GASTRO-ESOPHAGEAL REFLUX DISEASE WITHOUT ESOPHAGITIS: Chronic | Status: ACTIVE | Noted: 2017-09-21

## 2019-05-01 PROBLEM — E55.9 VITAMIN D DEFICIENCY, UNSPECIFIED: Chronic | Status: ACTIVE | Noted: 2017-09-21

## 2019-06-26 ENCOUNTER — EMERGENCY (EMERGENCY)
Facility: HOSPITAL | Age: 59
LOS: 1 days | Discharge: HOME | End: 2019-06-26
Attending: EMERGENCY MEDICINE | Admitting: EMERGENCY MEDICINE
Payer: MEDICARE

## 2019-06-26 VITALS
HEART RATE: 60 BPM | TEMPERATURE: 98 F | SYSTOLIC BLOOD PRESSURE: 139 MMHG | OXYGEN SATURATION: 97 % | DIASTOLIC BLOOD PRESSURE: 82 MMHG | RESPIRATION RATE: 18 BRPM

## 2019-06-26 VITALS
RESPIRATION RATE: 16 BRPM | TEMPERATURE: 99 F | OXYGEN SATURATION: 96 % | DIASTOLIC BLOOD PRESSURE: 76 MMHG | HEART RATE: 72 BPM | SYSTOLIC BLOOD PRESSURE: 118 MMHG | HEIGHT: 65 IN | WEIGHT: 139.99 LBS

## 2019-06-26 PROCEDURE — 70450 CT HEAD/BRAIN W/O DYE: CPT

## 2019-06-26 PROCEDURE — 99284 EMERGENCY DEPT VISIT MOD MDM: CPT

## 2019-06-26 PROCEDURE — 70450 CT HEAD/BRAIN W/O DYE: CPT | Mod: 26

## 2019-06-26 PROCEDURE — 99284 EMERGENCY DEPT VISIT MOD MDM: CPT | Mod: 25

## 2019-06-26 NOTE — ED ADULT NURSE NOTE - NSSUSCREENINGQ1_ED_ALL_ED
No Bilobed Flap Text: The defect edges were debeveled with a #15 scalpel blade.  Given the location of the defect and the proximity to free margins a bilobe flap was deemed most appropriate.  Using a sterile surgical marker, an appropriate bilobe flap drawn around the defect.    The area thus outlined was incised deep to adipose tissue with a #15 scalpel blade.  The skin margins were undermined to an appropriate distance in all directions utilizing iris scissors.

## 2019-06-26 NOTE — ED ADULT NURSE NOTE - MUSCULOSKELETAL WDL
Full range of motion of upper and lower extremities, no joint tenderness/swelling. Shortness of breath Shortness of breath Shortness of breath

## 2019-06-26 NOTE — ED PROVIDER NOTE - CONSTITUTIONAL, MLM
normal... Well appearing, MRDD white female, well nourished, awake, alert, oriented to person, place, time/situation and in no apparent distress.

## 2019-06-26 NOTE — ED ADULT NURSE NOTE - NSIMPLEMENTINTERV_GEN_ALL_ED
Implemented All Fall Risk Interventions:  [place] to call system. Call bell, personal items and telephone within reach. Instruct patient to call for assistance. Room bathroom lighting operational. Non-slip footwear when patient is off stretcher. Physically safe environment: no spills, clutter or unnecessary equipment. Stretcher in lowest position, wheels locked, appropriate side rails in place. Provide visual cue, wrist band, yellow gown, etc. Monitor gait and stability. Monitor for mental status changes and reorient to person, place, and time. Review medications for side effects contributing to fall risk. Reinforce activity limits and safety measures with patient and family.

## 2019-06-26 NOTE — ED PROVIDER NOTE - OBJECTIVE STATEMENT
60 yo white female with mild to moderate MRDD sent here for evaluation after she fell from wheelchair and hit back of head on ground w/o LOC and or neck pains. In addition denies any nausea or vomiting. This is a isolated injury.

## 2019-07-12 ENCOUNTER — OTHER (OUTPATIENT)
Age: 59
End: 2019-07-12

## 2019-07-12 ENCOUNTER — APPOINTMENT (OUTPATIENT)
Dept: NEUROLOGY | Facility: CLINIC | Age: 59
End: 2019-07-12
Payer: MEDICARE

## 2019-07-12 VITALS — SYSTOLIC BLOOD PRESSURE: 142 MMHG | HEIGHT: 59 IN | DIASTOLIC BLOOD PRESSURE: 72 MMHG | HEART RATE: 84 BPM

## 2019-07-12 DIAGNOSIS — F31.9 BIPOLAR DISORDER, UNSPECIFIED: ICD-10-CM

## 2019-07-12 DIAGNOSIS — R26.9 UNSPECIFIED ABNORMALITIES OF GAIT AND MOBILITY: ICD-10-CM

## 2019-07-12 PROCEDURE — 99205 OFFICE O/P NEW HI 60 MIN: CPT

## 2019-07-12 NOTE — PHYSICAL EXAM
[Edema] : there was no peripheral edema [] : no respiratory distress [Abdomen Soft] : soft [Skin Color & Pigmentation] : normal skin color and pigmentation [FreeTextEntry1] : unable to test

## 2019-07-12 NOTE — DISCUSSION/SUMMARY
[FreeTextEntry1] : This is a 59-year-old right-handed female with past medical history of intellectual disability, bipolar disorder scoliosis and breast cancer who presents with recent changes in behavior, tremors, losing weight et cetera. Her sister is concerned as one of her brothers has been diagnosed with [place]'s disease. Her mother had similar symptoms. Her father had dementia.\par Impression: The patient does not have any chorea. She is noted to have tremors which are mostly action in nature but also some postural and resting tremulousness. Patient has bilateral bradykinesia right worse than left an increase in tone left versus right. She has frequent lipsmacking. She also has difficulty ambulating which is chronic but has become worse over the last year.\par Patient probably has tardive dyskinesia given that she is on long-term antipsychotic medications. Currently she is on olanzapine she is to be on malleril. She could also have drug-induced parkinsonism. She informs me that her brother did not have any chorea-like movements but mostly forgetfulness balance issues etc. and had a positive Summit gene test. Their mother also did not have chorea-like movements. She is on Depakote, fluoxetine, olanzapine - that can have tremor as a side effect\par Plan\par MRI brain with and without contrast\par Speech and swallow evaluation\par Physical therapy patient already has a prescription for that\par Labs including B12, folate, CRP, TSH, CPK, ESR, paraneoplastic panel, serum copper and ceruloplasmin.\par Consultation with Dr. Barbosa medical genetics to rule out Summit's disease.\par Followup in 2 months

## 2019-07-12 NOTE — HISTORY OF PRESENT ILLNESS
[FreeTextEntry1] : This is a 59-year-old right-handed female who presents with her sister Chary Miller and an advocate from the group home [facility].\par The history is obtained mostly from her sister. Sister states that the 5 siblings and the orthotist when has been diagnosed with [place]'s disease 3 years ago. His health is severely declining from it. She believes that her mother also had Cache's disease although she was formally diagnosed as having questionable Parkinson's. She had loss of balance urinary incontinence. Her father had dementia. Sanna is noticing that mainly in his having more notable changes in the last year. She's noticed worsening of balance no behavioral issues losing weight and eating very fast. She does not choke however at the group home they are giving her puréed foods to prevent aspiration. She used to weight 220 pounds and now weighs only 140. They have also notice tremors in the hands for about the last year to 6 months. She feels that Anette is getting symptoms similar to the older brother Jacob and would like her to be tested for Roxie's disease.\par Patient has history of intellectual disability, bipolar disorder she also has had gait disorder secondary to scoliosis. She's also had surgery for that. Over the last year however her sister has noticed that psychologically she is getting worse her gait has deteriorated and in spite of eating a lot very fast she is losing weight. also noticed hand tremors. In the past patient used to be on Mellaril and now she is on olanzapine.\par \par Past medical history scoliosis, breast cancer, thyroid disease, bipolar disease, intellectual disability. Patient has had breast surgery, tonsil surgery and back surgery.\par \par Review of systems a complete review of systems is performed and is negative except as listed in history of present illness\par \par \par Medications\par Valproic acid 125 mg 4 capsules 3 times a day\par Fluoxetine 20 mg once a day\par Levothyroxine\par Lithium carbonate 300 mg twice a day\par Olanzapine 50 mg 2 tablets daily\par Tamoxifen 20 mg daily\par Olanzapine 10 mg daily.\par \par Family history as above mother was diagnosed with questionable Parkinson's disease but the daughter is not sure and feels that she could have had Roxie's disease as she had significant disturbance of balance and incontinence. Father had dementia. The oldest brother has been diagnosed with Cache's disease 3 years ago.\par \par Social history patient lives in a group home.Her brother Jacob is her legal guardian however he is reportedly from his diagnosis of Cache's disease. Sister Sanna is applying for legal guardianship

## 2019-07-26 PROBLEM — F31.9 BIPOLAR DISORDER: Status: ACTIVE | Noted: 2017-12-13

## 2019-07-26 PROBLEM — R26.9 GAIT ABNORMALITY: Status: ACTIVE | Noted: 2017-12-13

## 2019-07-29 LAB — ERYTHROCYTE [SEDIMENTATION RATE] IN BLOOD BY WESTERGREN METHOD: < 2 MM/HR

## 2019-07-30 LAB
ALBUMIN SERPL ELPH-MCNC: 3.8 G/DL
ALP BLD-CCNC: 54 U/L
ALT SERPL-CCNC: 14 U/L
ANION GAP SERPL CALC-SCNC: 13 MMOL/L
AST SERPL-CCNC: 21 U/L
BILIRUB SERPL-MCNC: 0.3 MG/DL
BUN SERPL-MCNC: 20 MG/DL
CALCIUM SERPL-MCNC: 9.5 MG/DL
CERULOPLASMIN SERPL-MCNC: 30 MG/DL
CHLORIDE SERPL-SCNC: 105 MMOL/L
CK SERPL-CCNC: 42 U/L
CO2 SERPL-SCNC: 25 MMOL/L
CREAT SERPL-MCNC: 1.15 MG/DL
FOLATE SERPL-MCNC: 12.6 NG/ML
GLUCOSE SERPL-MCNC: 85 MG/DL
POTASSIUM SERPL-SCNC: 4.2 MMOL/L
PROT SERPL-MCNC: 6.7 G/DL
SODIUM SERPL-SCNC: 143 MMOL/L
TSH SERPL-ACNC: 5.76 UIU/ML
VIT B12 SERPL-MCNC: 964 PG/ML

## 2019-07-31 ENCOUNTER — FORM ENCOUNTER (OUTPATIENT)
Age: 59
End: 2019-07-31

## 2019-07-31 LAB — COPPER SERPL-MCNC: 131 UG/DL

## 2019-08-01 ENCOUNTER — APPOINTMENT (OUTPATIENT)
Dept: MRI IMAGING | Facility: CLINIC | Age: 59
End: 2019-08-01
Payer: MEDICARE

## 2019-08-01 ENCOUNTER — OUTPATIENT (OUTPATIENT)
Dept: OUTPATIENT SERVICES | Facility: HOSPITAL | Age: 59
LOS: 1 days | End: 2019-08-01
Payer: MEDICARE

## 2019-08-01 DIAGNOSIS — R26.9 UNSPECIFIED ABNORMALITIES OF GAIT AND MOBILITY: ICD-10-CM

## 2019-08-01 LAB
ARSENIC, BLOOD: 5 UG/L
CADMIUM SERPL-MCNC: 0.7 UG/L
LEAD BLD-MCNC: NORMAL UG/DL
MERCURY BLD-MCNC: NORMAL UG/L

## 2019-08-01 PROCEDURE — 70553 MRI BRAIN STEM W/O & W/DYE: CPT

## 2019-08-01 PROCEDURE — 70553 MRI BRAIN STEM W/O & W/DYE: CPT | Mod: 26

## 2019-08-01 PROCEDURE — A9585: CPT

## 2019-08-02 ENCOUNTER — APPOINTMENT (OUTPATIENT)
Dept: PEDIATRIC MEDICAL GENETICS | Facility: CLINIC | Age: 59
End: 2019-08-02
Payer: MEDICARE

## 2019-08-02 DIAGNOSIS — Z82.0 FAMILY HISTORY OF EPILEPSY AND OTHER DISEASES OF THE NERVOUS SYSTEM: ICD-10-CM

## 2019-08-02 DIAGNOSIS — Z81.8 FAMILY HISTORY OF OTHER MENTAL AND BEHAVIORAL DISORDERS: ICD-10-CM

## 2019-08-02 PROCEDURE — 99204 OFFICE O/P NEW MOD 45 MIN: CPT

## 2019-08-02 PROCEDURE — 36415 COLL VENOUS BLD VENIPUNCTURE: CPT

## 2019-08-07 ENCOUNTER — LABORATORY RESULT (OUTPATIENT)
Age: 59
End: 2019-08-07

## 2019-08-09 LAB — PARANEOPLASTIC AB PNL SER: NORMAL

## 2019-08-15 NOTE — BIRTH HISTORY
[FreeTextEntry1] : Information was obtained from Che's sister, Al Miller. Linda is the product of a twin pregnancy delivered prematurely, perhaps at 6-7 months. Her twin brother  at age 2 weeks because of underdeveloped lungs.

## 2019-08-15 NOTE — CONSULT LETTER
[Dear  ___] : Dear  [unfilled], [Consult Letter:] : I had the pleasure of evaluating your patient, [unfilled]. [Please see my note below.] : Please see my note below. [Consult Closing:] : Thank you very much for allowing me to participate in the care of this patient.  If you have any questions, please do not hesitate to contact me. [Sincerely,] : Sincerely, [FreeTextEntry2] : Dr. Street [FreeTextEntry3] : Carlos Kate, DO\par

## 2019-08-15 NOTE — REASON FOR VISIT
[Initial - Scheduled] : [unfilled]  is being seen for  ~M an initial scheduled visit [Formal Caregiver] : formal caregiver [FreeTextEntry3] : for evaluation and  genetic testing for [place] disease in this 59 year old female. Sivan Barrera is the genetic counselor involved.

## 2019-08-15 NOTE — FAMILY HISTORY
[FreeTextEntry1] : Ms. Miller states that Che's brother has been diagnosed with [place]'s disease 3 years ago, with a genetic test. Results were not provided. His health is severely declining. She believes that her mother also had [place]'s disease although she was formally diagnosed as having questionable Parkinson's. Her mother became symptomatic in her fifties with balance problems and had bone fractures because she was unsteady and had osteopenia. She  at age 76. Her father was an alcoholic and had onset of dementia after his wife , in his late 70's. He  at age 82. Che has one other brother who reportedly has  related cancer. Ms. Miller stated that she had HD testing which was negative, her other sister's report is pending. Che has no children. Che's mother had breast cancer, her maternal uncle had stomach cancer and her maternal aunt had leukemia.

## 2019-08-19 ENCOUNTER — FORM ENCOUNTER (OUTPATIENT)
Age: 59
End: 2019-08-19

## 2019-08-20 ENCOUNTER — APPOINTMENT (OUTPATIENT)
Dept: MRI IMAGING | Facility: CLINIC | Age: 59
End: 2019-08-20
Payer: MEDICARE

## 2019-08-20 ENCOUNTER — OUTPATIENT (OUTPATIENT)
Dept: OUTPATIENT SERVICES | Facility: HOSPITAL | Age: 59
LOS: 1 days | End: 2019-08-20
Payer: MEDICARE

## 2019-08-20 DIAGNOSIS — Z00.8 ENCOUNTER FOR OTHER GENERAL EXAMINATION: ICD-10-CM

## 2019-08-20 PROCEDURE — 72156 MRI NECK SPINE W/O & W/DYE: CPT

## 2019-08-20 PROCEDURE — A9585: CPT

## 2019-08-20 PROCEDURE — 72156 MRI NECK SPINE W/O & W/DYE: CPT | Mod: 26

## 2019-08-26 NOTE — ED PROVIDER NOTE - TEMPLATE, MLM
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    1. Atrial fibrillation with RVR  - c/w current rate control meds  - s/p watchman, no AC anticoagulation     2. Chronic diastolic congestive heart failure  - dyspnea: Multifactorial, COPD/pulm Fibrosis, pulm HTN, and CHF  - TTE EF 60-65%, PASP 57.8mmHg RAP 15mmHg, consistent w/moderate pulm htn  - c/w Lasix 40mg PO QD  - nuclear stress test pending result  - pending result will consider right heart cath to confirm pulm pressures  - will not  as no Pulm HTN meds available in his case    3. Pulmonary hypertension  - plan as above    Preliminary evaluation, please await complete evaluation by Dr. Beal after pending nuclear stress test results Head Injury

## 2019-09-05 ENCOUNTER — APPOINTMENT (OUTPATIENT)
Dept: SPEECH THERAPY | Facility: CLINIC | Age: 59
End: 2019-09-05

## 2019-09-05 ENCOUNTER — OUTPATIENT (OUTPATIENT)
Dept: OUTPATIENT SERVICES | Facility: HOSPITAL | Age: 59
LOS: 1 days | Discharge: HOME | End: 2019-09-05

## 2019-10-09 DIAGNOSIS — R13.12 DYSPHAGIA, OROPHARYNGEAL PHASE: ICD-10-CM

## 2019-10-14 ENCOUNTER — APPOINTMENT (OUTPATIENT)
Dept: NEUROLOGY | Facility: CLINIC | Age: 59
End: 2019-10-14
Payer: MEDICARE

## 2019-10-14 VITALS
BODY MASS INDEX: 28.63 KG/M2 | DIASTOLIC BLOOD PRESSURE: 81 MMHG | WEIGHT: 142 LBS | HEIGHT: 59 IN | SYSTOLIC BLOOD PRESSURE: 129 MMHG | HEART RATE: 70 BPM

## 2019-10-14 DIAGNOSIS — R25.1 TREMOR, UNSPECIFIED: ICD-10-CM

## 2019-10-14 PROCEDURE — 99214 OFFICE O/P EST MOD 30 MIN: CPT

## 2019-10-14 NOTE — HISTORY OF PRESENT ILLNESS
[FreeTextEntry1] : This is a 59-year-old right-handed female who presents with her sister Chary Miller and an advocate from the group home [facility].\par The history is obtained mostly from her sister. Sister states that the 5 siblings and the orthotist when has been diagnosed with [place]ala's disease 3 years ago. His health is severely declining from it. She believes that her mother also had Attala's disease although she was formally diagnosed as having questionable Parkinson's. She had loss of balance urinary incontinence. Her father had dementia. Sanna is noticing that mainly in his having more notable changes in the last year. She's noticed worsening of balance no behavioral issues losing weight and eating very fast. She does not choke however at the group home they are giving her puréed foods to prevent aspiration. She used to weight 220 pounds and now weighs only 140. They have also notice tremors in the hands for about the last year to 6 months. She feels that Anette is getting symptoms similar to the older brother Jacob and would like her to be tested for Roxie's disease.\par Patient has history of intellectual disability, bipolar disorder she also has had gait disorder secondary to scoliosis. She's also had surgery for that. Over the last year however her sister has noticed that psychologically she is getting worse her gait has deteriorated and in spite of eating a lot very fast she is losing weight. also noticed hand tremors. In the past patient used to be on Mellaril and now she is on olanzapine.\par \par Past medical history scoliosis, breast cancer, thyroid disease, bipolar disease, intellectual disability. Patient has had breast surgery, tonsil surgery and back surgery.\par \par Review of systems a complete review of systems is performed and is negative except as listed in history of present illness\par \par \par Medications\par Valproic acid 125 mg 4 capsules 3 times a day\par Fluoxetine 20 mg once a day\par Levothyroxine\par Lithium carbonate 300 mg twice a day\par Olanzapine 50 mg 2 tablets daily\par Tamoxifen 20 mg daily\par Olanzapine 10 mg daily.\par \par Family history as above mother was diagnosed with questionable Parkinson's disease but the daughter is not sure and feels that she could have had Roxie's disease as she had significant disturbance of balance and incontinence. Father had dementia. The oldest brother has been diagnosed with Attala's disease 3 years ago.\par \par Social history patient lives in a group home.Her brother Jacob is her legal guardian however he is reportedly from his diagnosis of Attala's disease. Sister Sanna is applying for legal guardianship\par \par Interval history: unchanged, continues to have tremor. here to follow up on results of work up done so far. had 1 fall when her sister attempted to get her out of bed without assistance. mood/ behavioral changes persist

## 2019-10-14 NOTE — DISCUSSION/SUMMARY
[FreeTextEntry1] : This is a 59-year-old right-handed female with past medical history of intellectual disability, bipolar disorder scoliosis and breast cancer who presents with recent changes in behavior, tremors, losing weight et cetera. Her sister is concerned as one of her brothers has been diagnosed with [place]. [place]'s disease. Her mother had similar symptoms. Her father had dementia.\par Impression: The patient does not have any chorea. She is noted to have tremors which are mostly action in nature but also some postural and resting tremulousness. Patient has bilateral bradykinesia right worse than left an increase in tone left versus right. She has frequent lipsmacking. She also has difficulty ambulating which is chronic but has become worse over the last year.\par Patient probably has tardive dyskinesia given that she is on long-term antipsychotic medications. Currently she is on olanzapine she is to be on malleril. She could also have drug-induced parkinsonism. She informs me that her brother did not have any chorea-like movements but mostly forgetfulness balance issues etc. and had a positive [place] gene test. Their mother also did not have chorea-like movements. She is on Depakote, fluoxetine, olanzapine - that can have tremor as a side effect\par Plan\par MRI brain with and without contrast \par FINDINGS: \par Moderate prominence of the ventricles and sulci. \par There is no intraparenchymal hematoma, mass effect or midline shift. There is no intraparenchymal \par hematoma, mass effect or midline shift. \par No abnormal extra-axial fluid collections are present. There is no diffusion abnormality to suggest acute or \par subacute infarction. Major flow-voids at the base of the brain follow expected course and contour. \par The calvarium is intact. The visualized intraorbital compartments, paranasal sinuses and tympanomastoid \par cavities appear free of acute disease. abnormal T1 prolongation at C5\par MRI CSpine:\par IMPRESSION: Moderate multilevel cervical spondylosis that is most advanced at C4-C5 and C5-C6 \par with central canal and foraminal narrowing as detailed above. There is endplate marrow edema and \par mild enhancement at the C5-C6 disc space that appears to be degenerative in nature. There is no \par osseous lesion identified. \par Partially imaged upper thoracic degenerative disc disease and scoliosis with right-sided foraminal \par narrowing at T1-T2 and T2-T3. Dedicated imaging of the thoracic spine can be obtained as clinically \par desired.\par Speech and swallow evaluation done, suggestions made \par Labs including B12, folate, CRP, TSH, CPK, ESR, paraneoplastic panel, serum copper and ceruloplasmin - negative except elevated TSH, f/u scheduled this week with PMD\par Huntingtin gene test negative\par given parkinsonism, drug induced Vs Idiopathic, DAWNA Scan ordered Pending\par Ms. Trivedi follows with a private neurologist for the tremors and had scheduled with us for r/o Huntingtons. The family will discuss, and may opt to follow up with primary neurologist.\par Followup as per family

## 2019-10-14 NOTE — PHYSICAL EXAM
[Abdomen Soft] : soft [Edema] : there was no peripheral edema [] : no respiratory distress [Skin Color & Pigmentation] : normal skin color and pigmentation [FreeTextEntry1] : scoliosis

## 2019-10-14 NOTE — DATA REVIEWED
[de-identified] : MRI CSpine\par IMPRESSION: Moderate multilevel cervical spondylosis that is most advanced at C4-C5 and C5-C6 \par with central canal and foraminal narrowing as detailed above. There is endplate marrow edema and \par mild enhancement at the C5-C6 disc space that appears to be degenerative in nature. There is no \par osseous lesion identified. \par Partially imaged upper thoracic degenerative disc disease and scoliosis with right-sided foraminal \par narrowing at T1-T2 and T2-T3. Dedicated imaging of the thoracic spine can be obtained as clinically \par desired.\par \par MRI BRain with and without contrast\par FINDINGS: \par Moderate prominence of the ventricles and sulci. \par There is no intraparenchymal hematoma, mass effect or midline shift. There is no intraparenchymal \par hematoma, mass effect or midline shift. \par No abnormal extra-axial fluid collections are present. There is no diffusion abnormality to suggest acute or \par subacute infarction. Major flow-voids at the base of the brain follow expected course and contour. \par The calvarium is intact. The visualized intraorbital compartments, paranasal sinuses and tympanomastoid \par cavities appear free of acute disease.

## 2020-02-03 ENCOUNTER — APPOINTMENT (OUTPATIENT)
Dept: ULTRASOUND IMAGING | Facility: CLINIC | Age: 60
End: 2020-02-03
Payer: MEDICARE

## 2020-02-03 ENCOUNTER — OUTPATIENT (OUTPATIENT)
Dept: OUTPATIENT SERVICES | Facility: HOSPITAL | Age: 60
LOS: 1 days | End: 2020-02-03
Payer: MEDICARE

## 2020-02-03 DIAGNOSIS — Z00.8 ENCOUNTER FOR OTHER GENERAL EXAMINATION: ICD-10-CM

## 2020-02-03 PROCEDURE — 76856 US EXAM PELVIC COMPLETE: CPT

## 2020-02-03 PROCEDURE — 76856 US EXAM PELVIC COMPLETE: CPT | Mod: 26

## 2020-02-20 NOTE — ED BEHAVIORAL HEALTH ASSESSMENT NOTE - JUDGMENT (REGARDING EVERYDAY EVENTS)
Returned call but no answer. Left message for pt requesting he call back to discuss concerns.     Poor

## 2020-02-24 ENCOUNTER — FORM ENCOUNTER (OUTPATIENT)
Age: 60
End: 2020-02-24

## 2020-02-25 ENCOUNTER — OUTPATIENT (OUTPATIENT)
Dept: OUTPATIENT SERVICES | Facility: HOSPITAL | Age: 60
LOS: 1 days | End: 2020-02-25

## 2020-02-25 ENCOUNTER — APPOINTMENT (OUTPATIENT)
Dept: NUCLEAR MEDICINE | Facility: HOSPITAL | Age: 60
End: 2020-02-25
Payer: MEDICARE

## 2020-02-25 DIAGNOSIS — R26.9 UNSPECIFIED ABNORMALITIES OF GAIT AND MOBILITY: ICD-10-CM

## 2020-02-25 PROCEDURE — 78803 RP LOCLZJ TUM SPECT 1 AREA: CPT | Mod: 26

## 2020-06-15 NOTE — ED PROVIDER NOTE - CHPI ED SYMPTOMS NEG
Patient needs the order changed for the MRI. His insurance will not cover it. Please advise.   no vomiting/no loss of consciousness/no nausea/no syncope/no seizure/no confusion/no dizziness/no change in level of consciousness/no blurred vision

## 2020-08-03 ENCOUNTER — LABORATORY RESULT (OUTPATIENT)
Age: 60
End: 2020-08-03

## 2020-08-04 ENCOUNTER — APPOINTMENT (OUTPATIENT)
Dept: OBGYN | Facility: CLINIC | Age: 60
End: 2020-08-04
Payer: MEDICARE

## 2020-08-04 ENCOUNTER — OUTPATIENT (OUTPATIENT)
Dept: OUTPATIENT SERVICES | Facility: HOSPITAL | Age: 60
LOS: 1 days | End: 2020-08-04
Payer: MEDICARE

## 2020-08-04 DIAGNOSIS — Z85.3 PERSONAL HISTORY OF MALIGNANT NEOPLASM OF BREAST: ICD-10-CM

## 2020-08-04 DIAGNOSIS — N76.0 ACUTE VAGINITIS: ICD-10-CM

## 2020-08-04 DIAGNOSIS — Z00.00 ENCOUNTER FOR GENERAL ADULT MEDICAL EXAMINATION W/OUT ABNORMAL FINDINGS: ICD-10-CM

## 2020-08-04 DIAGNOSIS — N95.0 POSTMENOPAUSAL BLEEDING: ICD-10-CM

## 2020-08-04 PROCEDURE — 99203 OFFICE O/P NEW LOW 30 MIN: CPT | Mod: NC

## 2020-08-04 PROCEDURE — 88141 CYTOPATH C/V INTERPRET: CPT

## 2020-08-04 PROCEDURE — 87624 HPV HI-RISK TYP POOLED RSLT: CPT

## 2020-08-04 PROCEDURE — G0463: CPT

## 2020-08-04 PROCEDURE — 88175 CYTOPATH C/V AUTO FLUID REDO: CPT

## 2020-08-04 NOTE — PHYSICAL EXAM
[Awake] : awake [Alert] : alert [Acute Distress] : no acute distress [Mass] : no breast mass [Vulvar Atrophy] : vulvar atrophy [Normal] : uterus [Atrophy] : atrophy [Tenderness] : nontender [Mass ___ cm] : no uterine mass was palpated [Adnexa Tenderness] : were not tender [Ovarian Mass (___ Cm)] : there were no adnexal masses [FreeTextEntry4] : tight introitus- able to tolerate pediatric spec insertion but not blade opening. very atrophic / friable vaginal mucosa.   Unable to visualize cvx os.  From digital exam- flush with post vagina.  Pt would not tolerate blind pap attempt.     [FreeTextEntry7] : limited bimanual secondary to pt positional limitations

## 2020-08-05 LAB — HPV HIGH+LOW RISK DNA PNL CVX: SIGNIFICANT CHANGE UP

## 2020-08-07 LAB — CYTOLOGY SPEC DOC CYTO: SIGNIFICANT CHANGE UP

## 2020-08-12 DIAGNOSIS — N95.0 POSTMENOPAUSAL BLEEDING: ICD-10-CM

## 2020-10-01 ENCOUNTER — OUTPATIENT (OUTPATIENT)
Dept: OUTPATIENT SERVICES | Facility: HOSPITAL | Age: 60
LOS: 1 days | End: 2020-10-01
Payer: MEDICARE

## 2020-10-01 VITALS
SYSTOLIC BLOOD PRESSURE: 110 MMHG | DIASTOLIC BLOOD PRESSURE: 70 MMHG | HEART RATE: 62 BPM | RESPIRATION RATE: 18 BRPM | OXYGEN SATURATION: 97 % | TEMPERATURE: 97 F | HEIGHT: 59 IN | WEIGHT: 130.07 LBS

## 2020-10-01 DIAGNOSIS — F20.9 SCHIZOPHRENIA, UNSPECIFIED: ICD-10-CM

## 2020-10-01 DIAGNOSIS — Z01.818 ENCOUNTER FOR OTHER PREPROCEDURAL EXAMINATION: ICD-10-CM

## 2020-10-01 DIAGNOSIS — Z90.11 ACQUIRED ABSENCE OF RIGHT BREAST AND NIPPLE: Chronic | ICD-10-CM

## 2020-10-01 DIAGNOSIS — N95.0 POSTMENOPAUSAL BLEEDING: ICD-10-CM

## 2020-10-01 DIAGNOSIS — C50.919 MALIGNANT NEOPLASM OF UNSPECIFIED SITE OF UNSPECIFIED FEMALE BREAST: ICD-10-CM

## 2020-10-01 DIAGNOSIS — Z98.890 OTHER SPECIFIED POSTPROCEDURAL STATES: Chronic | ICD-10-CM

## 2020-10-01 LAB
ANION GAP SERPL CALC-SCNC: 10 MMOL/L — SIGNIFICANT CHANGE UP (ref 5–17)
BUN SERPL-MCNC: 22 MG/DL — SIGNIFICANT CHANGE UP (ref 7–23)
CALCIUM SERPL-MCNC: 9.5 MG/DL — SIGNIFICANT CHANGE UP (ref 8.4–10.5)
CHLORIDE SERPL-SCNC: 105 MMOL/L — SIGNIFICANT CHANGE UP (ref 96–108)
CO2 SERPL-SCNC: 25 MMOL/L — SIGNIFICANT CHANGE UP (ref 22–31)
CREAT SERPL-MCNC: 1.18 MG/DL — SIGNIFICANT CHANGE UP (ref 0.5–1.3)
GLUCOSE SERPL-MCNC: 72 MG/DL — SIGNIFICANT CHANGE UP (ref 70–99)
HCT VFR BLD CALC: 38.3 % — SIGNIFICANT CHANGE UP (ref 34.5–45)
HGB BLD-MCNC: 11.9 G/DL — SIGNIFICANT CHANGE UP (ref 11.5–15.5)
MCHC RBC-ENTMCNC: 31.1 GM/DL — LOW (ref 32–36)
MCHC RBC-ENTMCNC: 31.8 PG — SIGNIFICANT CHANGE UP (ref 27–34)
MCV RBC AUTO: 102.4 FL — HIGH (ref 80–100)
NRBC # BLD: 0 /100 WBCS — SIGNIFICANT CHANGE UP (ref 0–0)
PLATELET # BLD AUTO: 68 K/UL — LOW (ref 150–400)
POTASSIUM SERPL-MCNC: 4.6 MMOL/L — SIGNIFICANT CHANGE UP (ref 3.5–5.3)
POTASSIUM SERPL-SCNC: 4.6 MMOL/L — SIGNIFICANT CHANGE UP (ref 3.5–5.3)
RBC # BLD: 3.74 M/UL — LOW (ref 3.8–5.2)
RBC # FLD: 16.8 % — HIGH (ref 10.3–14.5)
SARS-COV-2 RNA SPEC QL NAA+PROBE: SIGNIFICANT CHANGE UP
SODIUM SERPL-SCNC: 140 MMOL/L — SIGNIFICANT CHANGE UP (ref 135–145)
WBC # BLD: 5.87 K/UL — SIGNIFICANT CHANGE UP (ref 3.8–10.5)
WBC # FLD AUTO: 5.87 K/UL — SIGNIFICANT CHANGE UP (ref 3.8–10.5)

## 2020-10-01 PROCEDURE — G0463: CPT

## 2020-10-01 PROCEDURE — 85027 COMPLETE CBC AUTOMATED: CPT

## 2020-10-01 PROCEDURE — 80048 BASIC METABOLIC PNL TOTAL CA: CPT

## 2020-10-01 PROCEDURE — U0003: CPT

## 2020-10-01 RX ORDER — LITHIUM CARBONATE 300 MG/1
0 TABLET, EXTENDED RELEASE ORAL
Qty: 0 | Refills: 0 | DISCHARGE

## 2020-10-01 NOTE — H&P PST ADULT - NSICDXPASTSURGICALHX_GEN_ALL_CORE_FT
PAST SURGICAL HISTORY:  H/O mastectomy, right      PAST SURGICAL HISTORY:  H/O mastectomy, right     Previous back surgery Juanjose rahman

## 2020-10-01 NOTE — H&P PST ADULT - HISTORY OF PRESENT ILLNESS
60 year old female poor historian resides in [facility] ([facility]), presents to preadmission testing for scheduled Pap smear/ D&C on 10/6/2020 for post menopausal bleeding. Medical and surgical information obtained from patient's record, patient' sister (Sanna over the phone), and [facility] staff (Kathy). History include bipolar schizophrenia, wheelchair bound, seizure (last episode, "many years ago", gait abnormality, knees contracture, previous back surgery (Harright rods in place), breast malignancy s/p right mastectomy.   covid-19 swab done in preadmission testing on 10/1/2020  patient to have medical evaluation by her pcp prior to surgery, [facility] staff made aware.

## 2020-10-01 NOTE — H&P PST ADULT - NSICDXPROBLEM_GEN_ALL_CORE_FT
PROBLEM DIAGNOSES  Problem: Post-menopausal bleeding  Assessment and Plan: scheduled for Pap smear/ D&C  both verbal and written instruction given to group home staff   patient to be evaluated by her pcp prior to surgery     Problem: Schizophrenia  Assessment and Plan: patient to have medication morning of surgery with sips of water     Problem: Cancer of breast  Assessment and Plan: right arm restriction   no blood pressure measurement, no blood draw from right arm

## 2020-10-01 NOTE — H&P PST ADULT - NSICDXPASTMEDICALHX_GEN_ALL_CORE_FT
PAST MEDICAL HISTORY:  Breast cancer     GERD (gastroesophageal reflux disease)     MR (mental retardation)     Seizure     Vitamin D deficiency      PAST MEDICAL HISTORY:  Breast cancer right breast    GERD (gastroesophageal reflux disease)     MR (mental retardation)     Seizure last episode "many years ago"    Vitamin D deficiency

## 2020-10-06 ENCOUNTER — APPOINTMENT (OUTPATIENT)
Dept: OBGYN | Facility: HOSPITAL | Age: 60
End: 2020-10-06

## 2021-04-21 PROBLEM — R56.9 UNSPECIFIED CONVULSIONS: Chronic | Status: ACTIVE | Noted: 2017-09-21

## 2021-04-27 ENCOUNTER — OUTPATIENT (OUTPATIENT)
Dept: OUTPATIENT SERVICES | Facility: HOSPITAL | Age: 61
LOS: 1 days | End: 2021-04-27
Payer: MEDICARE

## 2021-04-27 VITALS
SYSTOLIC BLOOD PRESSURE: 101 MMHG | TEMPERATURE: 98 F | DIASTOLIC BLOOD PRESSURE: 69 MMHG | HEART RATE: 73 BPM | WEIGHT: 147.93 LBS | RESPIRATION RATE: 16 BRPM | OXYGEN SATURATION: 98 % | HEIGHT: 63 IN

## 2021-04-27 DIAGNOSIS — Z86.2 PERSONAL HISTORY OF DISEASES OF THE BLOOD AND BLOOD-FORMING ORGANS AND CERTAIN DISORDERS INVOLVING THE IMMUNE MECHANISM: Chronic | ICD-10-CM

## 2021-04-27 DIAGNOSIS — Z90.89 ACQUIRED ABSENCE OF OTHER ORGANS: Chronic | ICD-10-CM

## 2021-04-27 DIAGNOSIS — Z98.890 OTHER SPECIFIED POSTPROCEDURAL STATES: Chronic | ICD-10-CM

## 2021-04-27 DIAGNOSIS — N95.0 POSTMENOPAUSAL BLEEDING: ICD-10-CM

## 2021-04-27 DIAGNOSIS — Z90.11 ACQUIRED ABSENCE OF RIGHT BREAST AND NIPPLE: Chronic | ICD-10-CM

## 2021-04-27 DIAGNOSIS — Z01.818 ENCOUNTER FOR OTHER PREPROCEDURAL EXAMINATION: ICD-10-CM

## 2021-04-27 LAB
ANION GAP SERPL CALC-SCNC: 10 MMOL/L — SIGNIFICANT CHANGE UP (ref 5–17)
BUN SERPL-MCNC: 24 MG/DL — HIGH (ref 7–23)
CALCIUM SERPL-MCNC: 9 MG/DL — SIGNIFICANT CHANGE UP (ref 8.4–10.5)
CHLORIDE SERPL-SCNC: 116 MMOL/L — HIGH (ref 96–108)
CO2 SERPL-SCNC: 23 MMOL/L — SIGNIFICANT CHANGE UP (ref 22–31)
CREAT SERPL-MCNC: 1.58 MG/DL — HIGH (ref 0.5–1.3)
GLUCOSE SERPL-MCNC: 76 MG/DL — SIGNIFICANT CHANGE UP (ref 70–99)
HCT VFR BLD CALC: 38.3 % — SIGNIFICANT CHANGE UP (ref 34.5–45)
HGB BLD-MCNC: 11.6 G/DL — SIGNIFICANT CHANGE UP (ref 11.5–15.5)
MCHC RBC-ENTMCNC: 30.3 GM/DL — LOW (ref 32–36)
MCHC RBC-ENTMCNC: 32.2 PG — SIGNIFICANT CHANGE UP (ref 27–34)
MCV RBC AUTO: 106.4 FL — HIGH (ref 80–100)
NRBC # BLD: 0 /100 WBCS — SIGNIFICANT CHANGE UP (ref 0–0)
PLATELET # BLD AUTO: 107 K/UL — LOW (ref 150–400)
POTASSIUM SERPL-MCNC: 4.2 MMOL/L — SIGNIFICANT CHANGE UP (ref 3.5–5.3)
POTASSIUM SERPL-SCNC: 4.2 MMOL/L — SIGNIFICANT CHANGE UP (ref 3.5–5.3)
RBC # BLD: 3.6 M/UL — LOW (ref 3.8–5.2)
RBC # FLD: 17.2 % — HIGH (ref 10.3–14.5)
SODIUM SERPL-SCNC: 149 MMOL/L — HIGH (ref 135–145)
WBC # BLD: 6.22 K/UL — SIGNIFICANT CHANGE UP (ref 3.8–10.5)
WBC # FLD AUTO: 6.22 K/UL — SIGNIFICANT CHANGE UP (ref 3.8–10.5)

## 2021-04-27 PROCEDURE — G0463: CPT

## 2021-04-27 PROCEDURE — 80048 BASIC METABOLIC PNL TOTAL CA: CPT

## 2021-04-27 PROCEDURE — 85027 COMPLETE CBC AUTOMATED: CPT

## 2021-04-27 RX ORDER — SODIUM CHLORIDE 9 MG/ML
3 INJECTION INTRAMUSCULAR; INTRAVENOUS; SUBCUTANEOUS EVERY 8 HOURS
Refills: 0 | Status: DISCONTINUED | OUTPATIENT
Start: 2021-05-03 | End: 2021-05-17

## 2021-04-27 RX ORDER — LIDOCAINE HCL 20 MG/ML
0.2 VIAL (ML) INJECTION ONCE
Refills: 0 | Status: DISCONTINUED | OUTPATIENT
Start: 2021-05-03 | End: 2021-05-17

## 2021-04-27 NOTE — H&P PST ADULT - NSICDXPASTSURGICALHX_GEN_ALL_CORE_FT
PAST SURGICAL HISTORY:  H/O mastectomy, right 04/01/2011    Previous back surgery Fuentes rods for scoliosis    S/P tonsillectomy

## 2021-04-27 NOTE — H&P PST ADULT - HISTORY OF PRESENT ILLNESS
Patient is a 60 year old female poor historian resides in [facility] ([facility]), presents to preadmission testing for scheduled for EUA, Pap smear/D&C on 05/032021 for intermittent post menopausal bleeding. Medical and surgical information obtained from patient's record and [facility] staff (Kathy). History includes bipolar schizophrenia, wheelchair bound ("just stopped walking about a year ago, she wants everything to be done for her" - testing for [place]'s disease and Parkinson's negative, patient refused to walk, was throwing herself on the floor), seizure (last episode age 11), gait abnormality, knees contracture, previous back surgery (Fuentes rods in place), breast malignancy s/p right mastectomy in 2011.     Covid-19 swab 04/30/21, patient to have medical evaluation by her pcp prior to surgery on 04/30/21.     Of note, surgery was originally scheduled for 10/6/2020, was cancelled due to platelets 68 - see heme/onc note in chart - mild thrombocytopenia, recent platelets 92, no treatment required currently.  Patient is a 60 year old female poor historian resides in [facility] ([facility]), presents to preadmission testing for scheduled for EUA, Pap smear/D&C on 05/03/2021 for intermittent post menopausal bleeding. Medical and surgical information obtained from patient's record and [facility] staff (Kathy). History includes MR, bipolar schizophrenia, wheelchair bound ("just stopped walking about a year ago, she wants everything to be done for her" - testing for Roxie's disease and Parkinson's negative, patient refused to walk, was throwing herself on the floor), seizure (last episode age 11), gait abnormality, knees contracture, previous back surgery (Fuentes rods in place), breast malignancy s/p right mastectomy in 2011.     Covid-19 swab 04/30/21, patient to have medical evaluation by her pcp prior to surgery on 04/30/21.     Of note, surgery was originally scheduled for 10/6/2020, was cancelled due to platelets 68 - see heme/onc note in chart - mild thrombocytopenia, recent platelets 92, no treatment required currently.

## 2021-04-27 NOTE — H&P PST ADULT - NSICDXPASTMEDICALHX_GEN_ALL_CORE_FT
PAST MEDICAL HISTORY:  Bipolar disorder     Breast cancer right breast (invasive lobular carcinoma), 2011, was on Ramoxifen, now stopped    DDD (degenerative disc disease), thoracic     DVT (deep venous thrombosis) - Right LE, 01/2021 - on Eliquis    GERD (gastroesophageal reflux disease)     H/O thrombocytopenia - mild, observation only as per hematology Dr. EDER Nation    History of gait disorder due to scoliosis, testing for [place]'s disease negative    MR (mental retardation) with progressively deteriorating behavior issues, refuses to walk    Schizophrenia     Seizure last episode "many years ago"    Spondylosis, cervical     Tremor     Vitamin D deficiency

## 2021-04-27 NOTE — H&P PST ADULT - ATTENDING COMMENTS
Agree with above  Pt with hx of PMB  Pt for EUA/D and C/PAP today  Pt unable to consent for herself--consent signed by sister.    Pt's guardian understands risks of surgery including but not limited to bleeding, infection, injury to bowel/bladder, nerve damage, blood tx, uterine perforation and even death.  Pt's guardian has verbalized understanding of the above and has signed informed consent.    LONG Hernandez

## 2021-04-27 NOTE — H&P PST ADULT - MUSCULOSKELETAL COMMENTS
knees contracture, no strength, completely dependent. As per aid, surgeon's office confirmed availability of Martha lift in [facility] contractures knees, poor effort, unable/unwilling to ambulate knees contracture, no strength, completely dependent.

## 2021-04-27 NOTE — H&P PST ADULT - FEMALE-SPECIFIC SYMPTOMS
6 mo ago, spotting 2 times since (unclear if from hemorrhoids as per staff)/abnormal vaginal bleeding

## 2021-04-30 ENCOUNTER — OUTPATIENT (OUTPATIENT)
Dept: OUTPATIENT SERVICES | Facility: HOSPITAL | Age: 61
LOS: 1 days | End: 2021-04-30
Payer: MEDICARE

## 2021-04-30 DIAGNOSIS — Z98.890 OTHER SPECIFIED POSTPROCEDURAL STATES: Chronic | ICD-10-CM

## 2021-04-30 DIAGNOSIS — Z90.89 ACQUIRED ABSENCE OF OTHER ORGANS: Chronic | ICD-10-CM

## 2021-04-30 DIAGNOSIS — Z90.11 ACQUIRED ABSENCE OF RIGHT BREAST AND NIPPLE: Chronic | ICD-10-CM

## 2021-04-30 DIAGNOSIS — Z11.52 ENCOUNTER FOR SCREENING FOR COVID-19: ICD-10-CM

## 2021-04-30 LAB — SARS-COV-2 RNA SPEC QL NAA+PROBE: SIGNIFICANT CHANGE UP

## 2021-04-30 PROCEDURE — C9803: CPT

## 2021-04-30 PROCEDURE — U0003: CPT

## 2021-04-30 PROCEDURE — U0005: CPT

## 2021-05-02 ENCOUNTER — TRANSCRIPTION ENCOUNTER (OUTPATIENT)
Age: 61
End: 2021-05-02

## 2021-05-03 ENCOUNTER — APPOINTMENT (OUTPATIENT)
Dept: OBGYN | Facility: HOSPITAL | Age: 61
End: 2021-05-03

## 2021-05-03 ENCOUNTER — OUTPATIENT (OUTPATIENT)
Dept: OUTPATIENT SERVICES | Facility: HOSPITAL | Age: 61
LOS: 1 days | End: 2021-05-03
Payer: MEDICARE

## 2021-05-03 ENCOUNTER — RESULT REVIEW (OUTPATIENT)
Age: 61
End: 2021-05-03

## 2021-05-03 VITALS
HEART RATE: 81 BPM | RESPIRATION RATE: 16 BRPM | DIASTOLIC BLOOD PRESSURE: 91 MMHG | SYSTOLIC BLOOD PRESSURE: 128 MMHG | TEMPERATURE: 98 F | WEIGHT: 147.93 LBS | HEIGHT: 63 IN | OXYGEN SATURATION: 98 %

## 2021-05-03 VITALS
OXYGEN SATURATION: 98 % | RESPIRATION RATE: 14 BRPM | HEART RATE: 90 BPM | DIASTOLIC BLOOD PRESSURE: 83 MMHG | SYSTOLIC BLOOD PRESSURE: 126 MMHG | TEMPERATURE: 97 F

## 2021-05-03 DIAGNOSIS — Z90.11 ACQUIRED ABSENCE OF RIGHT BREAST AND NIPPLE: Chronic | ICD-10-CM

## 2021-05-03 DIAGNOSIS — Z01.818 ENCOUNTER FOR OTHER PREPROCEDURAL EXAMINATION: ICD-10-CM

## 2021-05-03 DIAGNOSIS — Z90.89 ACQUIRED ABSENCE OF OTHER ORGANS: Chronic | ICD-10-CM

## 2021-05-03 DIAGNOSIS — N95.0 POSTMENOPAUSAL BLEEDING: ICD-10-CM

## 2021-05-03 DIAGNOSIS — Z98.890 OTHER SPECIFIED POSTPROCEDURAL STATES: Chronic | ICD-10-CM

## 2021-05-03 PROCEDURE — 88305 TISSUE EXAM BY PATHOLOGIST: CPT | Mod: 26

## 2021-05-03 PROCEDURE — 58558 HYSTEROSCOPY BIOPSY: CPT

## 2021-05-03 PROCEDURE — 88360 TUMOR IMMUNOHISTOCHEM/MANUAL: CPT | Mod: 26

## 2021-05-03 PROCEDURE — 88342 IMHCHEM/IMCYTCHM 1ST ANTB: CPT | Mod: 26,59

## 2021-05-03 RX ORDER — LEVOTHYROXINE SODIUM 125 MCG
1 TABLET ORAL
Qty: 0 | Refills: 0 | DISCHARGE

## 2021-05-03 RX ORDER — OXYCODONE HYDROCHLORIDE 5 MG/1
5 TABLET ORAL ONCE
Refills: 0 | Status: DISCONTINUED | OUTPATIENT
Start: 2021-05-03 | End: 2021-05-03

## 2021-05-03 RX ORDER — LETROZOLE 2.5 MG/1
1 TABLET, FILM COATED ORAL
Qty: 0 | Refills: 0 | DISCHARGE

## 2021-05-03 RX ORDER — OLANZAPINE 15 MG/1
1 TABLET, FILM COATED ORAL
Qty: 0 | Refills: 0 | DISCHARGE

## 2021-05-03 RX ORDER — CHOLECALCIFEROL (VITAMIN D3) 125 MCG
0 CAPSULE ORAL
Qty: 0 | Refills: 0 | DISCHARGE

## 2021-05-03 RX ORDER — ONDANSETRON 8 MG/1
4 TABLET, FILM COATED ORAL ONCE
Refills: 0 | Status: DISCONTINUED | OUTPATIENT
Start: 2021-05-03 | End: 2021-05-17

## 2021-05-03 RX ORDER — FLUOXETINE HCL 10 MG
1 CAPSULE ORAL
Qty: 0 | Refills: 0 | DISCHARGE

## 2021-05-03 RX ORDER — DOCUSATE SODIUM 100 MG
1 CAPSULE ORAL
Qty: 0 | Refills: 0 | DISCHARGE

## 2021-05-03 NOTE — ASU DISCHARGE PLAN (ADULT/PEDIATRIC) - CALL YOUR DOCTOR IF YOU HAVE ANY OF THE FOLLOWING:
Bleeding that does not stop/Pain not relieved by Medications/Fever greater than (need to indicate Fahrenheit or Celsius) Bleeding that does not stop/Pain not relieved by Medications/Fever greater than (need to indicate Fahrenheit or Celsius)/Wound/Surgical Site with redness, or foul smelling discharge or pus/Unable to urinate/Inability to tolerate liquids or foods

## 2021-05-03 NOTE — ASU DISCHARGE PLAN (ADULT/PEDIATRIC) - PROVIDER TOKENS
FREE:[LAST:[clinic],PHONE:[(   )    -],FAX:[(   )    -],ADDRESS:[[address] [place] [address], [address] [place] [address].  Phone: [phone]]]

## 2021-05-03 NOTE — BRIEF OPERATIVE NOTE - NSICDXBRIEFPROCEDURE_GEN_ALL_CORE_FT
PROCEDURES:  Dilation and curettage of uterus with endometrial biopsy 03-May-2021 13:32:00  Diego Alston  Exam under anesthesia, pelvic 03-May-2021 13:32:25  Diego Alston  Pap smear 03-May-2021 13:33:23  Diego Alston

## 2021-05-03 NOTE — ASU DISCHARGE PLAN (ADULT/PEDIATRIC) - NURSING INSTRUCTIONS
Next dose of Tylenol will be on or after _______6:36 PM____ ,today/tonight and every 6 hours afterwards as needed for pain management, do not take any Tylenol containing products until this time. Your first dose of Tylenol was given at ______12:36 PM_____. Do not exceed more than 4000mg of Tylenol in one 24 hour setting. If no contraindications, you may alternate with Ibuprofen  3 hours after dose of Tylenol. Ibuprofen can be taken every 6 hours.

## 2021-05-03 NOTE — ASU DISCHARGE PLAN (ADULT/PEDIATRIC) - CARE PROVIDER_API CALL
[facility],   [address] [place] [address], [address] [place] [address].  Phone: [phone]  Phone: (   )    -  Fax: (   )    -  Follow Up Time:

## 2021-05-03 NOTE — ASU PATIENT PROFILE, ADULT - PSH
H/O mastectomy, right  04/01/2011  Previous back surgery  Fuentes rods for scoliosis  S/P tonsillectomy

## 2021-05-03 NOTE — ASU PATIENT PROFILE, ADULT - PMH
Bipolar disorder    Breast cancer  right breast (invasive lobular carcinoma), 2011, was on Ramoxifen, now stopped  DDD (degenerative disc disease), thoracic    DVT (deep venous thrombosis)  - Right LE, 01/2021 - on Eliquis  GERD (gastroesophageal reflux disease)    H/O thrombocytopenia  - mild, observation only as per hematology Dr. EDER Nation  History of gait disorder  due to scoliosis, testing for Roxie's disease negative  MR (mental retardation)  with progressively deteriorating behavior issues, refuses to walk  Schizophrenia    Seizure  last episode "many years ago"  Spondylosis, cervical    Tremor    Vitamin D deficiency

## 2021-05-04 PROCEDURE — 88341 IMHCHEM/IMCYTCHM EA ADD ANTB: CPT | Mod: XU

## 2021-05-04 PROCEDURE — 88305 TISSUE EXAM BY PATHOLOGIST: CPT

## 2021-05-04 PROCEDURE — 58120 DILATION AND CURETTAGE: CPT

## 2021-05-04 PROCEDURE — 88360 TUMOR IMMUNOHISTOCHEM/MANUAL: CPT

## 2021-05-04 PROCEDURE — 88342 IMHCHEM/IMCYTCHM 1ST ANTB: CPT

## 2021-05-04 PROCEDURE — 87624 HPV HI-RISK TYP POOLED RSLT: CPT

## 2023-02-23 NOTE — ED BEHAVIORAL HEALTH ASSESSMENT NOTE - RISK ASSESSMENT
Ijeoma Germain is a 24 year old female presenting    Chief Complaint   Patient presents with   • Medication Management   • Breast Pain     6 month med check    Concern: R breast  Started: 3 weeks   Onset: Sudden.    Symptoms include R lower breast pain around her period, firmness,   Denies: nipple discharge  Pain level is moderate  Symptoms occurring with pressure of laying on R side  Aggravated by week leading up and week of period  Alleviated by after her period  Additional comments/concerns include mother side has a history of dense breast    Health Maintenance Due   Topic Date Due   • Pneumococcal Vaccine 0-64 (1 - PCV) Never done   • COVID-19 Vaccine (3 - Booster for Pfizer series) 07/08/2021   • Influenza Vaccine (1) 09/01/2022   • Depression Screening  08/02/2023     Patient is proceeding with depression screening. Declines vaccines    Reviewed patient's allergies   Medication list verified with patient's assistance.  Tobacco history verified with patient    Risk factors include chronic mental illness of schizoaffective disorder with limited insight and baseline cognitive limitations due to mild MR.  Patient with recent acute psychotic symptoms with poor impulse control and precipitating factors seems to be multiple recent medication changes without benefit.  Protective factors seems to be supportive housing environment and staff relationships in place.  Due to acute disorganization with labile affect, paranoid delusions and recent dangerous behaviors exhibited patient represents an acute risk to self at this time.

## 2023-04-28 NOTE — ED BEHAVIORAL HEALTH ASSESSMENT NOTE - DETAILS
Called patient to ask where and when her surgery took place. She had her posterior L4-5 fusion done at [facility] in [place] with, Dr. Lovell, in what she thinks is 2001. She also informed me she has tried injections 6-8 years ago, but they only helped for up to a day. These were done by Domitila Brooks, at [facility].   d/w JONATHAN, Dr. Brannon Penicillin d/w Sabina Plascencia RN

## 2024-04-15 NOTE — BRIEF OPERATIVE NOTE - NSICDXBRIEFOPLAUNCH_GEN_ALL_CORE
Detail Level: Detailed Size Of Lesion: 2mm Morphology Per Location (Optional): Reddish tone <--- Click to Launch ICDx for PreOp, PostOp and Procedure

## 2025-03-06 NOTE — ED BEHAVIORAL HEALTH ASSESSMENT NOTE - NS ED [facility] BILLING ATTENDING WO NP TRAINEE
03/06/25 0912   [facility] Disability Status   Are you deaf or do you have serious difficulty hearing? N   Are you blind or do you have serious difficulty seeing, even when wearing glasses? N   Because of a physical, mental, or emotional condition, do you have serious difficulty concentrating, remembering, or making decisions? (5 years old or older) N   Do you have serious difficulty walking or climbing stairs? N   Do you have serious difficulty dressing or bathing? N   Because of a physical, mental, or emotional condition, do you have serious difficulty doing errands alone such as visiting the doctor? N        [id number]

## 2025-06-03 NOTE — ED BEHAVIORAL HEALTH ASSESSMENT NOTE - AFFECT QUALITY
6/3/2025    PRIMARY: Blayne Philippe N, DO    ASSESSMENT:    1. S/P TAVR (transcatheter aortic valve replacement)    2. Essential hypertension    3. LVH (left ventricular hypertrophy)        TAVR 2020: echo 7/2024 stable  HTN: she declines to take meds. I explained the significant risk she is taking  She take antibiotics for dental work    PLAN:  Echo      Patient Instructions   Echo after July    Shelbi Browne MA, Tasia RN, Pascale RN and I were pleased to meet with you today.     As part of your continued care with the [facility] Cardiology Team you may be scheduled with Cardiology Advanced Practice Providers(SATHISH). Our APPs are Nurse Practitioners and Physician Assistants who are specifically trained to care for patients with Cardiac Conditions. We will all work closely together to provide you with a comprehensive patient centered experience.  Our current team of Cardiology APPs include:  • BARBARA Puentes  • BARBARA Ordonez  • Yady Guzmán PA-C  • BARBARA Conteh    If you need refills - call your pharmacist and they will contact our office.   If you have medical concerns after hours, and to make appointments, call [phone].  If you have had testing done, anticipate results to be relayed to you within 3-5 days.  If you have not heard from our staff, please contact the office.     If you have a question during office hours call [phone].  You will eventually speak with my nurses.  If you need to speak to me directly, let them know and I will get back to you as soon as possible.  Better yet, you can consider signing up for [employer], our popular online communication portal to ask for a refill or contact our staff.  Go to:  [email or web address].    We encourage use of the patient portal to view results and communicate directly with staff with non-urgent concerns.    Dr. Manuel Galvin MD      Orders Placed This Encounter   • TTE Echo Complete     No follow-ups on file.      Pavan Echevarria is a 81  year old female here for evaluation of:  1. S/P TAVR (transcatheter aortic valve replacement)    2. Essential hypertension    3. LVH (left ventricular hypertrophy)        SUBJECTIVE:  She does not like how BP meds make her feel so she stopped. I offered alternative BP meds to try and she adamantly declines    6/3/2025: Per SAUL Mario, patient has no cardiac complaints. Denies chest pain or shortness of breath. No lightheadedness or palpitations. Her BP is significantly elevated today in office. She is not taking any of her blood pressure medications. She had echo completed 2024 which was stable, EF 73%.    Cardiac Diagnostics:  Echo 7/16/2024:   * S/P transcatheter aortic valve replacement (TAVR). Aortic valve mean  gradient 8 mmHg. Trivial paravalvular aortic regurgitation.  * Normal left ventricular chamber size, wall thickness and systolic function  with no regional wall motion abnormalities. LV EF 73 %.  * Grade I diastolic dysfunction of the left ventricle (impaired relaxation  pattern).  * Mildly increased left atrial volume index of 36 ml/m2.  * No significant change since the prior study of 03/2023.  Echo 3/30/2023: S/P transcatheter aortic valve replacement (TAVR).  Mild paravalvular aortic regurgitation.  Aortic valve mean gradient 7 mmHg.  Mildly increased left ventricular wall thickness.  Normal left ventricular systolic function with ejection fraction of 60 %.  Grade I left ventricular diastolic dysfunction.  Normal left ventricular diastolic filling pressure.  Abnormal LV Global longitudinal strain -16.1 %.  No significant change since the prior study.  Echo 12/13/2021: Statuspost transcatheter aortic valve replacement (TAVR).  Mild paravalvular regurgitation.  Left ventricular ejection fraction, 60% .  No significant change since the prior study.  TAVR 12/3/2020  Cardiac Cath 11/4/2020:  1.  Severe aortic stenosis.  2.  Mild nonobstructive coronary artery disease.  3.  Extensive calcification of the  ascending aorta.  4.  Widely patent distal abdominal aorta and aortoiliac arteries bilaterally  Echocardiogram 10/21/2020:  Severe aortic valve stenosis, mean gradient 67 mmHg, TIFFANY 0.5 cm².  Probable bicuspid aortic valve.  Moderate left ventricular hypertrophy.  Left ventricular ejection fraction, 65 %.  Grade I/IV diastolic dysfunction (abnormal relaxation filling pattern), normal to mildly elevated filling pressures.  Critical results were conveyed to the referring provider.  No prior study available for comparison.    Consult 2020  76 year old female who this summer fell off a horse.  He was noted to have a murmur.  She had an echocardiogram showing severe aortic stenosis aortic valve area 0.5 centimeters sq.  Aortic valve mean gradient over 60. The valve is probably bicuspid.  It was calcified.  Ascending aorta normal.  Moderate LVH.     He was told of a murmur when she was 20 years old but was told it was not.     Since the fall off the horse she has had episodes of chest discomfort radiating to the shoulders and the arms.  This can come on with exertion or when she is under emotional stress.  Also she has now had problems with significant blood pressure elevations.  This is also labile.  She has been started on amlodipine 10 mg. She said she never had high blood pressure before this but she did not see a doctor very frequently     ROS:Review of systems as above otherwise negative.    Current Outpatient Medications   Medication Sig   • ibuprofen (MOTRIN) 600 MG tablet Take 1 tablet by mouth every 6 hours as needed for pain.   • chlorhexidine gluconate (PERIDEX) 0.12 % solution Gently rinse with 15 mL and gently spit out twice daily (after brushing in the morning and right before bed) for 10 days starting tomorrow   • chlorhexidine gluconate (PERIDEX) 0.12 % solution Gentlyl rinse with 15 mLs and gently spit out twice daily (after brushing in morning and right before bed) for 10 days starting tomorrow.   •  latanoprost (XALATAN) 0.005 % ophthalmic solution Place 1 drop into both eyes nightly. (Patient not taking: Reported on 6/3/2025)   • amoxicillin (AMOXIL) 500 MG capsule Take 4 capsules by mouth 1 hour prior to dental appointments.   • dorzolamide-timolol (Cosopt) 2-0.5 % ophthalmic solution Place 1 drop into right eye in the morning and 1 drop in the evening. (Patient not taking: Reported on 6/3/2025)   • timolol (TIMOPTIC) 0.5 % ophthalmic solution Place 1 drop into both eyes in the morning and 1 drop in the evening. (Patient not taking: Reported on 6/3/2025)   • spironolactone (ALDACTONE) 25 MG tablet Take 1 tablet by mouth daily. (Patient not taking: Reported on 6/3/2025)   • losartan (COZAAR) 50 MG tablet Take 1 tablet by mouth 2 times daily. (Patient not taking: Reported on 6/3/2025)   • aspirin (Aspirin Childrens) 81 MG chewable tablet Chew 1 tablet by mouth daily. (Patient not taking: Reported on 6/3/2025)   • amLODIPine (NORVASC) 10 MG tablet Take 1 tablet by mouth daily. (Patient not taking: Reported on 6/3/2025)   • pantoprazole (PROTONIX) 40 MG tablet Take 1 tablet by mouth nightly. For 90 days then stop (Patient not taking: Reported on 6/3/2025)   • atorvastatin (LIPITOR) 20 MG tablet Take 1 tablet by mouth nightly. (Patient not taking: Reported on 6/3/2025)   • Methylsulfonylmethane (MSM) 500 MG Cap  (Patient not taking: Reported on 6/3/2025)   • Cholecalciferol 50 mcg (2,000 units) tablet Take 1 tablet by mouth daily. (Patient not taking: Reported on 6/3/2025)   • KRILL OIL PO Take 1 tablet by mouth daily. (Patient not taking: Reported on 6/3/2025)   • Multiple Vitamins-Minerals (EYE SUPPORT PO) Take 1 capsule by mouth daily. (Patient not taking: Reported on 5/21/2024)     No current facility-administered medications for this visit.       OBJECTIVE:  I have reviewed the patient's medications and allergies, problem list, past medical, surgical, social and family history, spending  time updating these  as appropriate, especially the problem list.  See Histories section of the EMR for a display of this information.         PHYSICAL EXAM:  Vitals:    06/03/25 1023   BP: (!) 214/96   Pulse:      BP Readings from Last 4 Encounters:   06/03/25 (!) 214/96   05/21/24 (!) 171/87   05/07/24 (!) 148/96   04/12/23 (!) 196/94     Wt Readings from Last 4 Encounters:   06/03/25 (!) 44.5 kg (98 lb)   05/21/24 (!) 43 kg (94 lb 11.2 oz)   05/07/24 (!) 43.1 kg (95 lb)   04/12/23 49.7 kg (109 lb 9.6 oz)     Body mass index is 17.64 kg/m².  Heart: Regular rate and rhythm and Systolic murmur  General appearance: alert  Neck: no carotid bruit and no JVD  Lungs: clear to auscultation bilaterally            Ejection Fraction   Date Value Ref Range Status   07/16/2024 73 % Final                 Manuel Galvin MD                                   No Elevated Depressed